# Patient Record
Sex: FEMALE | Race: OTHER | HISPANIC OR LATINO
[De-identification: names, ages, dates, MRNs, and addresses within clinical notes are randomized per-mention and may not be internally consistent; named-entity substitution may affect disease eponyms.]

---

## 2019-08-07 PROBLEM — Z00.00 ENCOUNTER FOR PREVENTIVE HEALTH EXAMINATION: Status: ACTIVE | Noted: 2019-08-07

## 2019-08-20 ENCOUNTER — APPOINTMENT (OUTPATIENT)
Dept: ORTHOPEDIC SURGERY | Facility: CLINIC | Age: 74
End: 2019-08-20

## 2021-07-26 ENCOUNTER — INPATIENT (INPATIENT)
Facility: HOSPITAL | Age: 76
LOS: 4 days | Discharge: HOME HEALTH | End: 2021-07-31
Attending: INTERNAL MEDICINE | Admitting: INTERNAL MEDICINE
Payer: MEDICARE

## 2021-07-26 VITALS
RESPIRATION RATE: 20 BRPM | DIASTOLIC BLOOD PRESSURE: 57 MMHG | SYSTOLIC BLOOD PRESSURE: 143 MMHG | TEMPERATURE: 100 F | HEART RATE: 78 BPM | OXYGEN SATURATION: 98 %

## 2021-07-26 DIAGNOSIS — U07.1 COVID-19: ICD-10-CM

## 2021-07-26 LAB
ALBUMIN SERPL ELPH-MCNC: 3.6 G/DL — SIGNIFICANT CHANGE UP (ref 3.3–5)
ALP SERPL-CCNC: 130 U/L — HIGH (ref 40–120)
ALT FLD-CCNC: 15 U/L — SIGNIFICANT CHANGE UP (ref 4–33)
ANION GAP SERPL CALC-SCNC: 15 MMOL/L — HIGH (ref 7–14)
AST SERPL-CCNC: 20 U/L — SIGNIFICANT CHANGE UP (ref 4–32)
B PERT DNA SPEC QL NAA+PROBE: SIGNIFICANT CHANGE UP
BASOPHILS # BLD AUTO: 0.07 K/UL — SIGNIFICANT CHANGE UP (ref 0–0.2)
BASOPHILS NFR BLD AUTO: 0.8 % — SIGNIFICANT CHANGE UP (ref 0–2)
BILIRUB SERPL-MCNC: 0.2 MG/DL — SIGNIFICANT CHANGE UP (ref 0.2–1.2)
BLOOD GAS VENOUS COMPREHENSIVE RESULT: SIGNIFICANT CHANGE UP
BUN SERPL-MCNC: 24 MG/DL — HIGH (ref 7–23)
C PNEUM DNA SPEC QL NAA+PROBE: SIGNIFICANT CHANGE UP
CALCIUM SERPL-MCNC: 9.4 MG/DL — SIGNIFICANT CHANGE UP (ref 8.4–10.5)
CHLORIDE SERPL-SCNC: 98 MMOL/L — SIGNIFICANT CHANGE UP (ref 98–107)
CO2 SERPL-SCNC: 24 MMOL/L — SIGNIFICANT CHANGE UP (ref 22–31)
CREAT SERPL-MCNC: 1.5 MG/DL — HIGH (ref 0.5–1.3)
D DIMER BLD IA.RAPID-MCNC: 269 NG/ML DDU — HIGH
EOSINOPHIL # BLD AUTO: 0.15 K/UL — SIGNIFICANT CHANGE UP (ref 0–0.5)
EOSINOPHIL NFR BLD AUTO: 1.7 % — SIGNIFICANT CHANGE UP (ref 0–6)
FLUAV SUBTYP SPEC NAA+PROBE: SIGNIFICANT CHANGE UP
FLUBV RNA SPEC QL NAA+PROBE: SIGNIFICANT CHANGE UP
GLUCOSE SERPL-MCNC: 124 MG/DL — HIGH (ref 70–99)
HADV DNA SPEC QL NAA+PROBE: SIGNIFICANT CHANGE UP
HCOV 229E RNA SPEC QL NAA+PROBE: SIGNIFICANT CHANGE UP
HCOV HKU1 RNA SPEC QL NAA+PROBE: SIGNIFICANT CHANGE UP
HCOV NL63 RNA SPEC QL NAA+PROBE: SIGNIFICANT CHANGE UP
HCOV OC43 RNA SPEC QL NAA+PROBE: SIGNIFICANT CHANGE UP
HCT VFR BLD CALC: 39.4 % — SIGNIFICANT CHANGE UP (ref 34.5–45)
HGB BLD-MCNC: 12.2 G/DL — SIGNIFICANT CHANGE UP (ref 11.5–15.5)
HMPV RNA SPEC QL NAA+PROBE: SIGNIFICANT CHANGE UP
HPIV1 RNA SPEC QL NAA+PROBE: SIGNIFICANT CHANGE UP
HPIV2 RNA SPEC QL NAA+PROBE: SIGNIFICANT CHANGE UP
HPIV3 RNA SPEC QL NAA+PROBE: SIGNIFICANT CHANGE UP
HPIV4 RNA SPEC QL NAA+PROBE: SIGNIFICANT CHANGE UP
IANC: 5.44 K/UL — SIGNIFICANT CHANGE UP (ref 1.5–8.5)
IMM GRANULOCYTES NFR BLD AUTO: 0.2 % — SIGNIFICANT CHANGE UP (ref 0–1.5)
LYMPHOCYTES # BLD AUTO: 1.83 K/UL — SIGNIFICANT CHANGE UP (ref 1–3.3)
LYMPHOCYTES # BLD AUTO: 21.1 % — SIGNIFICANT CHANGE UP (ref 13–44)
MAGNESIUM SERPL-MCNC: 2.1 MG/DL — SIGNIFICANT CHANGE UP (ref 1.6–2.6)
MCHC RBC-ENTMCNC: 27.1 PG — SIGNIFICANT CHANGE UP (ref 27–34)
MCHC RBC-ENTMCNC: 31 GM/DL — LOW (ref 32–36)
MCV RBC AUTO: 87.4 FL — SIGNIFICANT CHANGE UP (ref 80–100)
MONOCYTES # BLD AUTO: 1.15 K/UL — HIGH (ref 0–0.9)
MONOCYTES NFR BLD AUTO: 13.3 % — SIGNIFICANT CHANGE UP (ref 2–14)
NEUTROPHILS # BLD AUTO: 5.44 K/UL — SIGNIFICANT CHANGE UP (ref 1.8–7.4)
NEUTROPHILS NFR BLD AUTO: 62.9 % — SIGNIFICANT CHANGE UP (ref 43–77)
NRBC # BLD: 0 /100 WBCS — SIGNIFICANT CHANGE UP
NRBC # FLD: 0 K/UL — SIGNIFICANT CHANGE UP
PHOSPHATE SERPL-MCNC: 3.7 MG/DL — SIGNIFICANT CHANGE UP (ref 2.5–4.5)
PLATELET # BLD AUTO: 308 K/UL — SIGNIFICANT CHANGE UP (ref 150–400)
POTASSIUM SERPL-MCNC: 4.8 MMOL/L — SIGNIFICANT CHANGE UP (ref 3.5–5.3)
POTASSIUM SERPL-SCNC: 4.8 MMOL/L — SIGNIFICANT CHANGE UP (ref 3.5–5.3)
PROT SERPL-MCNC: 7.2 G/DL — SIGNIFICANT CHANGE UP (ref 6–8.3)
RAPID RVP RESULT: DETECTED
RBC # BLD: 4.51 M/UL — SIGNIFICANT CHANGE UP (ref 3.8–5.2)
RBC # FLD: 15 % — HIGH (ref 10.3–14.5)
RSV RNA SPEC QL NAA+PROBE: SIGNIFICANT CHANGE UP
RV+EV RNA SPEC QL NAA+PROBE: SIGNIFICANT CHANGE UP
SARS-COV-2 RNA SPEC QL NAA+PROBE: DETECTED
SODIUM SERPL-SCNC: 137 MMOL/L — SIGNIFICANT CHANGE UP (ref 135–145)
WBC # BLD: 8.66 K/UL — SIGNIFICANT CHANGE UP (ref 3.8–10.5)
WBC # FLD AUTO: 8.66 K/UL — SIGNIFICANT CHANGE UP (ref 3.8–10.5)

## 2021-07-26 PROCEDURE — 71045 X-RAY EXAM CHEST 1 VIEW: CPT | Mod: 26

## 2021-07-26 PROCEDURE — 99285 EMERGENCY DEPT VISIT HI MDM: CPT

## 2021-07-26 RX ORDER — DEXAMETHASONE 0.5 MG/5ML
6 ELIXIR ORAL ONCE
Refills: 0 | Status: COMPLETED | OUTPATIENT
Start: 2021-07-26 | End: 2021-07-26

## 2021-07-26 RX ORDER — BENZOCAINE AND MENTHOL 5; 1 G/100ML; G/100ML
1 LIQUID ORAL ONCE
Refills: 0 | Status: COMPLETED | OUTPATIENT
Start: 2021-07-26 | End: 2021-07-26

## 2021-07-26 RX ORDER — ACETAMINOPHEN 500 MG
650 TABLET ORAL ONCE
Refills: 0 | Status: COMPLETED | OUTPATIENT
Start: 2021-07-26 | End: 2021-07-26

## 2021-07-26 RX ORDER — SODIUM CHLORIDE 9 MG/ML
1000 INJECTION, SOLUTION INTRAVENOUS ONCE
Refills: 0 | Status: COMPLETED | OUTPATIENT
Start: 2021-07-26 | End: 2021-07-26

## 2021-07-26 RX ADMIN — Medication 650 MILLIGRAM(S): at 20:50

## 2021-07-26 RX ADMIN — Medication 6 MILLIGRAM(S): at 23:11

## 2021-07-26 RX ADMIN — SODIUM CHLORIDE 1000 MILLILITER(S): 9 INJECTION, SOLUTION INTRAVENOUS at 23:11

## 2021-07-26 RX ADMIN — Medication 100 MILLIGRAM(S): at 20:50

## 2021-07-26 NOTE — ED PROVIDER NOTE - PROGRESS NOTE DETAILS
Pt desating to 88/89% on room air at rest with any movement. Reports no known CKD. Given early on in course pt would benefit from admission. Francisco Rangel, BENEDICT PGY3

## 2021-07-26 NOTE — ED PROVIDER NOTE - ATTENDING CONTRIBUTION TO CARE
I performed a face-to-face evaluation of the patient and performed a history and physical examination. I agree with the history and physical examination.    Covid currently, fever, cough. No increase in baseline shortness of breath. During the daytime, mostly sits and watches TV. Even before Covid never really walked around much. Rumor is resting oxygen saturation 95%. Will check chest x-ray. Likely get charged with symptomatic treatment

## 2021-07-26 NOTE — ED PROVIDER NOTE - CLINICAL SUMMARY MEDICAL DECISION MAKING FREE TEXT BOX
Danette: Danette: COVID currently, fever, sore throat, cough. No increase in baseline shortness of breath. During the daytime, mostly sits and watches TV. Doesn't walk around. Even before COVID, never really walked around much. Room air resting oxygen saturation 95%. Will check chest x-ray. Likely get charged with symptomatic treatment.

## 2021-07-26 NOTE — ED ADULT NURSE NOTE - NSIMPLEMENTINTERV_GEN_ALL_ED
Implemented All Fall Risk Interventions:  Topmost to call system. Call bell, personal items and telephone within reach. Instruct patient to call for assistance. Room bathroom lighting operational. Non-slip footwear when patient is off stretcher. Physically safe environment: no spills, clutter or unnecessary equipment. Stretcher in lowest position, wheels locked, appropriate side rails in place. Provide visual cue, wrist band, yellow gown, etc. Monitor gait and stability. Monitor for mental status changes and reorient to person, place, and time. Review medications for side effects contributing to fall risk. Reinforce activity limits and safety measures with patient and family.

## 2021-07-26 NOTE — ED ADULT TRIAGE NOTE - DOMESTIC TRAVEL HIGH RISK QUESTION
Benefits, risks, and possible complications of procedure explained to patient/caregiver who verbalized understanding and gave written consent. No

## 2021-07-26 NOTE — ED PROVIDER NOTE - PHYSICAL EXAMINATION
Well appearing, well nourished, awake, alert, oriented to person, place, time/situation and in no apparent distress.    Airway patent    Eyes without scleral injection. No jaundice.    Strong pulse.    Respirations unlabored. (B) crackles.    Abdomen soft, non-tender, no guarding.    Spine appears normal, range of motion is not limited, no muscle or joint tenderness. No LE edema.     Alert and oriented, no gross motor or sensory deficits.    Skin normal color for race, warm, dry and intact. No evidence of rash.    No SI/HI.

## 2021-07-26 NOTE — ED ADULT NURSE NOTE - OBJECTIVE STATEMENT
received pt to rm 7, A&Ox4, nonambulatory at baseline. 77y/o female hx of dm complaining of sore throat and fever/chills. pt states she is covid positive. resps are even and unlabored, spo2 100% on RA. abdomen is soft, nontender, nondistended. denies nausea, vomiting, palpitations, abdominal pain, chest pain. 20G IV placed to left wrist, labs sent. meds given as ordered.

## 2021-07-27 DIAGNOSIS — E11.65 TYPE 2 DIABETES MELLITUS WITH HYPERGLYCEMIA: ICD-10-CM

## 2021-07-27 DIAGNOSIS — N17.9 ACUTE KIDNEY FAILURE, UNSPECIFIED: ICD-10-CM

## 2021-07-27 DIAGNOSIS — E03.9 HYPOTHYROIDISM, UNSPECIFIED: ICD-10-CM

## 2021-07-27 DIAGNOSIS — E11.69 TYPE 2 DIABETES MELLITUS WITH OTHER SPECIFIED COMPLICATION: ICD-10-CM

## 2021-07-27 DIAGNOSIS — Z29.9 ENCOUNTER FOR PROPHYLACTIC MEASURES, UNSPECIFIED: ICD-10-CM

## 2021-07-27 DIAGNOSIS — U07.1 COVID-19: ICD-10-CM

## 2021-07-27 LAB
A1C WITH ESTIMATED AVERAGE GLUCOSE RESULT: 9.2 % — HIGH (ref 4–5.6)
ALBUMIN SERPL ELPH-MCNC: 3.5 G/DL — SIGNIFICANT CHANGE UP (ref 3.3–5)
ALP SERPL-CCNC: 140 U/L — HIGH (ref 40–120)
ALT FLD-CCNC: 19 U/L — SIGNIFICANT CHANGE UP (ref 4–33)
ANION GAP SERPL CALC-SCNC: 14 MMOL/L — SIGNIFICANT CHANGE UP (ref 7–14)
ANION GAP SERPL CALC-SCNC: 17 MMOL/L — HIGH (ref 7–14)
APTT BLD: 32.1 SEC — SIGNIFICANT CHANGE UP (ref 27–36.3)
AST SERPL-CCNC: 22 U/L — SIGNIFICANT CHANGE UP (ref 4–32)
B-OH-BUTYR SERPL-SCNC: 0.6 MMOL/L — HIGH (ref 0–0.4)
BASOPHILS # BLD AUTO: 0.03 K/UL — SIGNIFICANT CHANGE UP (ref 0–0.2)
BASOPHILS NFR BLD AUTO: 0.4 % — SIGNIFICANT CHANGE UP (ref 0–2)
BILIRUB SERPL-MCNC: 0.2 MG/DL — SIGNIFICANT CHANGE UP (ref 0.2–1.2)
BUN SERPL-MCNC: 29 MG/DL — HIGH (ref 7–23)
BUN SERPL-MCNC: 31 MG/DL — HIGH (ref 7–23)
CALCIUM SERPL-MCNC: 8.9 MG/DL — SIGNIFICANT CHANGE UP (ref 8.4–10.5)
CALCIUM SERPL-MCNC: 9.6 MG/DL — SIGNIFICANT CHANGE UP (ref 8.4–10.5)
CHLORIDE SERPL-SCNC: 93 MMOL/L — LOW (ref 98–107)
CHLORIDE SERPL-SCNC: 97 MMOL/L — LOW (ref 98–107)
CO2 SERPL-SCNC: 21 MMOL/L — LOW (ref 22–31)
CO2 SERPL-SCNC: 22 MMOL/L — SIGNIFICANT CHANGE UP (ref 22–31)
COVID-19 SPIKE DOMAIN AB INTERP: POSITIVE
COVID-19 SPIKE DOMAIN ANTIBODY RESULT: >250 U/ML — HIGH
CREAT SERPL-MCNC: 1.4 MG/DL — HIGH (ref 0.5–1.3)
CREAT SERPL-MCNC: 1.41 MG/DL — HIGH (ref 0.5–1.3)
CRP SERPL-MCNC: 20.7 MG/L — HIGH
EOSINOPHIL # BLD AUTO: 0 K/UL — SIGNIFICANT CHANGE UP (ref 0–0.5)
EOSINOPHIL NFR BLD AUTO: 0 % — SIGNIFICANT CHANGE UP (ref 0–6)
ESTIMATED AVERAGE GLUCOSE: 217 — SIGNIFICANT CHANGE UP
FERRITIN SERPL-MCNC: 151 NG/ML — HIGH (ref 15–150)
GLUCOSE BLDC GLUCOMTR-MCNC: 277 MG/DL — HIGH (ref 70–99)
GLUCOSE BLDC GLUCOMTR-MCNC: 429 MG/DL — HIGH (ref 70–99)
GLUCOSE BLDC GLUCOMTR-MCNC: 492 MG/DL — CRITICAL HIGH (ref 70–99)
GLUCOSE BLDC GLUCOMTR-MCNC: 531 MG/DL — CRITICAL HIGH (ref 70–99)
GLUCOSE BLDC GLUCOMTR-MCNC: 555 MG/DL — CRITICAL HIGH (ref 70–99)
GLUCOSE BLDC GLUCOMTR-MCNC: 587 MG/DL — CRITICAL HIGH (ref 70–99)
GLUCOSE BLDC GLUCOMTR-MCNC: 588 MG/DL — CRITICAL HIGH (ref 70–99)
GLUCOSE SERPL-MCNC: 492 MG/DL — CRITICAL HIGH (ref 70–99)
GLUCOSE SERPL-MCNC: 614 MG/DL — CRITICAL HIGH (ref 70–99)
HCT VFR BLD CALC: 39 % — SIGNIFICANT CHANGE UP (ref 34.5–45)
HCV AB S/CO SERPL IA: 0.13 S/CO — SIGNIFICANT CHANGE UP (ref 0–0.99)
HCV AB SERPL-IMP: SIGNIFICANT CHANGE UP
HGB BLD-MCNC: 12.2 G/DL — SIGNIFICANT CHANGE UP (ref 11.5–15.5)
IANC: 5.67 K/UL — SIGNIFICANT CHANGE UP (ref 1.5–8.5)
IMM GRANULOCYTES NFR BLD AUTO: 0.4 % — SIGNIFICANT CHANGE UP (ref 0–1.5)
INR BLD: 1.05 RATIO — SIGNIFICANT CHANGE UP (ref 0.88–1.16)
LYMPHOCYTES # BLD AUTO: 1.17 K/UL — SIGNIFICANT CHANGE UP (ref 1–3.3)
LYMPHOCYTES # BLD AUTO: 16.7 % — SIGNIFICANT CHANGE UP (ref 13–44)
MAGNESIUM SERPL-MCNC: 2.2 MG/DL — SIGNIFICANT CHANGE UP (ref 1.6–2.6)
MCHC RBC-ENTMCNC: 27.2 PG — SIGNIFICANT CHANGE UP (ref 27–34)
MCHC RBC-ENTMCNC: 31.3 GM/DL — LOW (ref 32–36)
MCV RBC AUTO: 87.1 FL — SIGNIFICANT CHANGE UP (ref 80–100)
MONOCYTES # BLD AUTO: 0.1 K/UL — SIGNIFICANT CHANGE UP (ref 0–0.9)
MONOCYTES NFR BLD AUTO: 1.4 % — LOW (ref 2–14)
NEUTROPHILS # BLD AUTO: 5.67 K/UL — SIGNIFICANT CHANGE UP (ref 1.8–7.4)
NEUTROPHILS NFR BLD AUTO: 81.1 % — HIGH (ref 43–77)
NRBC # BLD: 0 /100 WBCS — SIGNIFICANT CHANGE UP
NRBC # FLD: 0 K/UL — SIGNIFICANT CHANGE UP
PHOSPHATE SERPL-MCNC: 4.3 MG/DL — SIGNIFICANT CHANGE UP (ref 2.5–4.5)
PLATELET # BLD AUTO: 220 K/UL — SIGNIFICANT CHANGE UP (ref 150–400)
POTASSIUM SERPL-MCNC: 5 MMOL/L — SIGNIFICANT CHANGE UP (ref 3.5–5.3)
POTASSIUM SERPL-MCNC: 5.7 MMOL/L — HIGH (ref 3.5–5.3)
POTASSIUM SERPL-SCNC: 5 MMOL/L — SIGNIFICANT CHANGE UP (ref 3.5–5.3)
POTASSIUM SERPL-SCNC: 5.7 MMOL/L — HIGH (ref 3.5–5.3)
PROCALCITONIN SERPL-MCNC: 0.42 NG/ML — HIGH (ref 0.02–0.1)
PROT SERPL-MCNC: 7 G/DL — SIGNIFICANT CHANGE UP (ref 6–8.3)
PROTHROM AB SERPL-ACNC: 12 SEC — SIGNIFICANT CHANGE UP (ref 10.6–13.6)
RBC # BLD: 4.48 M/UL — SIGNIFICANT CHANGE UP (ref 3.8–5.2)
RBC # FLD: 14.8 % — HIGH (ref 10.3–14.5)
SARS-COV-2 IGG+IGM SERPL QL IA: >250 U/ML — HIGH
SARS-COV-2 IGG+IGM SERPL QL IA: POSITIVE
SODIUM SERPL-SCNC: 129 MMOL/L — LOW (ref 135–145)
SODIUM SERPL-SCNC: 135 MMOL/L — SIGNIFICANT CHANGE UP (ref 135–145)
TSH SERPL-MCNC: 1.83 UIU/ML — SIGNIFICANT CHANGE UP (ref 0.27–4.2)
WBC # BLD: 7 K/UL — SIGNIFICANT CHANGE UP (ref 3.8–10.5)
WBC # FLD AUTO: 7 K/UL — SIGNIFICANT CHANGE UP (ref 3.8–10.5)

## 2021-07-27 PROCEDURE — 99223 1ST HOSP IP/OBS HIGH 75: CPT

## 2021-07-27 RX ORDER — INSULIN LISPRO 100/ML
VIAL (ML) SUBCUTANEOUS EVERY 6 HOURS
Refills: 0 | Status: DISCONTINUED | OUTPATIENT
Start: 2021-07-27 | End: 2021-07-28

## 2021-07-27 RX ORDER — INSULIN GLARGINE 100 [IU]/ML
35 INJECTION, SOLUTION SUBCUTANEOUS EVERY MORNING
Refills: 0 | Status: DISCONTINUED | OUTPATIENT
Start: 2021-07-28 | End: 2021-07-28

## 2021-07-27 RX ORDER — SODIUM ZIRCONIUM CYCLOSILICATE 10 G/10G
10 POWDER, FOR SUSPENSION ORAL THREE TIMES A DAY
Refills: 0 | Status: DISCONTINUED | OUTPATIENT
Start: 2021-07-27 | End: 2021-07-27

## 2021-07-27 RX ORDER — ENOXAPARIN SODIUM 100 MG/ML
40 INJECTION SUBCUTANEOUS EVERY 12 HOURS
Refills: 0 | Status: DISCONTINUED | OUTPATIENT
Start: 2021-07-27 | End: 2021-07-29

## 2021-07-27 RX ORDER — SODIUM CHLORIDE 9 MG/ML
1000 INJECTION, SOLUTION INTRAVENOUS
Refills: 0 | Status: DISCONTINUED | OUTPATIENT
Start: 2021-07-27 | End: 2021-07-31

## 2021-07-27 RX ORDER — INSULIN LISPRO 100/ML
VIAL (ML) SUBCUTANEOUS
Refills: 0 | Status: DISCONTINUED | OUTPATIENT
Start: 2021-07-27 | End: 2021-07-27

## 2021-07-27 RX ORDER — INSULIN GLARGINE 100 [IU]/ML
30 INJECTION, SOLUTION SUBCUTANEOUS AT BEDTIME
Refills: 0 | Status: DISCONTINUED | OUTPATIENT
Start: 2021-07-27 | End: 2021-07-27

## 2021-07-27 RX ORDER — ACETAMINOPHEN 500 MG
650 TABLET ORAL EVERY 6 HOURS
Refills: 0 | Status: DISCONTINUED | OUTPATIENT
Start: 2021-07-27 | End: 2021-07-31

## 2021-07-27 RX ORDER — DEXTROSE 50 % IN WATER 50 %
25 SYRINGE (ML) INTRAVENOUS ONCE
Refills: 0 | Status: DISCONTINUED | OUTPATIENT
Start: 2021-07-27 | End: 2021-07-31

## 2021-07-27 RX ORDER — ONDANSETRON 8 MG/1
4 TABLET, FILM COATED ORAL EVERY 8 HOURS
Refills: 0 | Status: DISCONTINUED | OUTPATIENT
Start: 2021-07-27 | End: 2021-07-31

## 2021-07-27 RX ORDER — GLUCAGON INJECTION, SOLUTION 0.5 MG/.1ML
1 INJECTION, SOLUTION SUBCUTANEOUS ONCE
Refills: 0 | Status: DISCONTINUED | OUTPATIENT
Start: 2021-07-27 | End: 2021-07-31

## 2021-07-27 RX ORDER — INSULIN GLARGINE 100 [IU]/ML
15 INJECTION, SOLUTION SUBCUTANEOUS ONCE
Refills: 0 | Status: COMPLETED | OUTPATIENT
Start: 2021-07-27 | End: 2021-07-27

## 2021-07-27 RX ORDER — SODIUM ZIRCONIUM CYCLOSILICATE 10 G/10G
10 POWDER, FOR SUSPENSION ORAL ONCE
Refills: 0 | Status: DISCONTINUED | OUTPATIENT
Start: 2021-07-27 | End: 2021-07-27

## 2021-07-27 RX ORDER — LEVOTHYROXINE SODIUM 125 MCG
50 TABLET ORAL DAILY
Refills: 0 | Status: DISCONTINUED | OUTPATIENT
Start: 2021-07-27 | End: 2021-07-31

## 2021-07-27 RX ORDER — DEXTROSE 50 % IN WATER 50 %
15 SYRINGE (ML) INTRAVENOUS ONCE
Refills: 0 | Status: DISCONTINUED | OUTPATIENT
Start: 2021-07-27 | End: 2021-07-31

## 2021-07-27 RX ORDER — FUROSEMIDE 40 MG
40 TABLET ORAL DAILY
Refills: 0 | Status: DISCONTINUED | OUTPATIENT
Start: 2021-07-27 | End: 2021-07-31

## 2021-07-27 RX ORDER — PANTOPRAZOLE SODIUM 20 MG/1
40 TABLET, DELAYED RELEASE ORAL
Refills: 0 | Status: DISCONTINUED | OUTPATIENT
Start: 2021-07-27 | End: 2021-07-31

## 2021-07-27 RX ORDER — LANOLIN ALCOHOL/MO/W.PET/CERES
3 CREAM (GRAM) TOPICAL AT BEDTIME
Refills: 0 | Status: DISCONTINUED | OUTPATIENT
Start: 2021-07-27 | End: 2021-07-31

## 2021-07-27 RX ORDER — REMDESIVIR 5 MG/ML
INJECTION INTRAVENOUS
Refills: 0 | Status: COMPLETED | OUTPATIENT
Start: 2021-07-27 | End: 2021-07-31

## 2021-07-27 RX ORDER — REMDESIVIR 5 MG/ML
100 INJECTION INTRAVENOUS EVERY 24 HOURS
Refills: 0 | Status: COMPLETED | OUTPATIENT
Start: 2021-07-28 | End: 2021-07-31

## 2021-07-27 RX ORDER — REMDESIVIR 5 MG/ML
200 INJECTION INTRAVENOUS EVERY 24 HOURS
Refills: 0 | Status: COMPLETED | OUTPATIENT
Start: 2021-07-27 | End: 2021-07-27

## 2021-07-27 RX ORDER — DEXTROSE 50 % IN WATER 50 %
12.5 SYRINGE (ML) INTRAVENOUS ONCE
Refills: 0 | Status: DISCONTINUED | OUTPATIENT
Start: 2021-07-27 | End: 2021-07-31

## 2021-07-27 RX ORDER — DEXAMETHASONE 0.5 MG/5ML
6 ELIXIR ORAL DAILY
Refills: 0 | Status: DISCONTINUED | OUTPATIENT
Start: 2021-07-27 | End: 2021-07-30

## 2021-07-27 RX ORDER — METOPROLOL TARTRATE 50 MG
100 TABLET ORAL DAILY
Refills: 0 | Status: DISCONTINUED | OUTPATIENT
Start: 2021-07-27 | End: 2021-07-31

## 2021-07-27 RX ORDER — INSULIN LISPRO 100/ML
VIAL (ML) SUBCUTANEOUS AT BEDTIME
Refills: 0 | Status: DISCONTINUED | OUTPATIENT
Start: 2021-07-27 | End: 2021-07-27

## 2021-07-27 RX ADMIN — ENOXAPARIN SODIUM 40 MILLIGRAM(S): 100 INJECTION SUBCUTANEOUS at 17:21

## 2021-07-27 RX ADMIN — INSULIN GLARGINE 15 UNIT(S): 100 INJECTION, SOLUTION SUBCUTANEOUS at 02:39

## 2021-07-27 RX ADMIN — Medication 6 MILLIGRAM(S): at 07:17

## 2021-07-27 RX ADMIN — Medication 12: at 17:24

## 2021-07-27 RX ADMIN — Medication 200 MILLIGRAM(S): at 07:18

## 2021-07-27 RX ADMIN — PANTOPRAZOLE SODIUM 40 MILLIGRAM(S): 20 TABLET, DELAYED RELEASE ORAL at 07:16

## 2021-07-27 RX ADMIN — Medication 40 MILLIGRAM(S): at 07:16

## 2021-07-27 RX ADMIN — Medication 12: at 09:04

## 2021-07-27 RX ADMIN — Medication 100 MILLIGRAM(S): at 07:17

## 2021-07-27 RX ADMIN — ENOXAPARIN SODIUM 40 MILLIGRAM(S): 100 INJECTION SUBCUTANEOUS at 07:16

## 2021-07-27 RX ADMIN — Medication 50 MICROGRAM(S): at 07:16

## 2021-07-27 RX ADMIN — INSULIN GLARGINE 15 UNIT(S): 100 INJECTION, SOLUTION SUBCUTANEOUS at 13:45

## 2021-07-27 RX ADMIN — REMDESIVIR 500 MILLIGRAM(S): 5 INJECTION INTRAVENOUS at 07:16

## 2021-07-27 RX ADMIN — Medication 12: at 12:05

## 2021-07-27 NOTE — CONSULT NOTE ADULT - SUBJECTIVE AND OBJECTIVE BOX
HPI:  75 yo f IDDM, HTN, Hypothyroid, leg swelling on lasix (also on toprol xl but no clear h/o HF) h/o stroke. Pt vaccinated with last dose of Moderna end of April. Presenting with several days cough, sore throat, generalized weakness, fever. COVID +.  T max here 100.3 (O). CXR prelim clear, although hazy cp margins. Pt at rest breathing conformably On 2 L, 100%, RR, however when ambulating short distance desats down to mid/upper 80s on RA. SCr 1.5, no baseline for comparison  (27 Jul 2021 01:00)    Endocrine History:    76 yr old F with primary hypothyroidism and Type 2 DM uncontrolled A1C 9.2% here with COVID. Patient has history of longstanding diabetes. At home she takes Lantus 30 units HS and Novolog 30 units before lunch and 30 units before dinner. She was on levothyroxine 50mcg daily but self discontinued 1 month ago. Found to have normal TSH 1.83. Her DM is complicated by CVA. Has high intake of carbs in her diet.             PAST MEDICAL & SURGICAL HISTORY:  Asthma    H/O: HTN (hypertension)    Hypothyroid    HLD (hyperlipidemia)    Diabetes mellitus    CVA (cerebrovascular accident)        FAMILY HISTORY: noncontributory      Social History: Lives with granddaughter    Outpatient Medications: See outpt med rec    MEDICATIONS  (STANDING):  dexAMETHasone  Injectable 6 milliGRAM(s) IV Push daily  dextrose 40% Gel 15 Gram(s) Oral once  dextrose 5%. 1000 milliLiter(s) (50 mL/Hr) IV Continuous <Continuous>  dextrose 5%. 1000 milliLiter(s) (100 mL/Hr) IV Continuous <Continuous>  dextrose 5%. 1000 milliLiter(s) (100 mL/Hr) IV Continuous <Continuous>  dextrose 50% Injectable 25 Gram(s) IV Push once  dextrose 50% Injectable 12.5 Gram(s) IV Push once  dextrose 50% Injectable 25 Gram(s) IV Push once  enoxaparin Injectable 40 milliGRAM(s) SubCutaneous every 12 hours  furosemide    Tablet 40 milliGRAM(s) Oral daily  glucagon  Injectable 1 milliGRAM(s) IntraMuscular once  glucagon  Injectable 1 milliGRAM(s) IntraMuscular once  insulin lispro (ADMELOG) corrective regimen sliding scale   SubCutaneous three times a day before meals  insulin lispro (ADMELOG) corrective regimen sliding scale   SubCutaneous at bedtime  levothyroxine 50 MICROGram(s) Oral daily  metoprolol succinate  milliGRAM(s) Oral daily  pantoprazole    Tablet 40 milliGRAM(s) Oral before breakfast  remdesivir  IVPB   IV Intermittent     MEDICATIONS  (PRN):  acetaminophen   Tablet .. 650 milliGRAM(s) Oral every 6 hours PRN Temp greater or equal to 38.5C (101.3F), Mild Pain (1 - 3)  aluminum hydroxide/magnesium hydroxide/simethicone Suspension 30 milliLiter(s) Oral every 4 hours PRN Dyspepsia  benzonatate 200 milliGRAM(s) Oral every 8 hours PRN Cough  melatonin 3 milliGRAM(s) Oral at bedtime PRN Insomnia  ondansetron Injectable 4 milliGRAM(s) IV Push every 8 hours PRN Nausea and/or Vomiting      Allergies    No Known Allergies    Intolerances      Review of Systems:  Constitutional: No fever  Eyes: No blurry vision  Neuro: No tremors  HEENT: No pain  Cardiovascular: No chest pain, palpitations  Respiratory: No SOB, no cough  GI: No nausea, vomiting, abdominal pain  : No dysuria  Skin: no rash  Psych: no depression  Endocrine: no polyuria, polydipsia      ALL OTHER SYSTEMS REVIEWED AND NEGATIVE      PHYSICAL EXAM:  VITALS: T(C): 36.4 (07-27-21 @ 06:12)  T(F): 97.5 (07-27-21 @ 06:12), Max: 100.3 (07-26-21 @ 19:50)  HR: 72 (07-27-21 @ 06:12) (65 - 78)  BP: 132/60 (07-27-21 @ 06:12) (120/59 - 143/57)  RR:  (16 - 20)  SpO2:  (96% - 100%)  Wt(kg): --  GENERAL: NAD, well-groomed, well-developed  EYES: No proptosis, no lid lag, anicteric  HEENT:  Atraumatic, Normocephalic, moist mucous membranes  RESPIRATORY: Clear to auscultation bilaterally  CARDIOVASCULAR: Regular rate and rhythm  GI: Soft, nontender, non distended, normal bowel sounds  SKIN: Dry, intact, No rashes or lesions  MUSCULOSKELETAL: Full range of motion, normal strength  NEURO: sensation intact, extraocular movements intact, no tremor, normal reflexes  PSYCH: Alert and oriented x 3, normal affect, normal mood    POCT Blood Glucose.: 531 mg/dL (07-27-21 @ 17:13)  POCT Blood Glucose.: 588 mg/dL (07-27-21 @ 13:36)  POCT Blood Glucose.: 587 mg/dL (07-27-21 @ 11:40)  POCT Blood Glucose.: 555 mg/dL (07-27-21 @ 11:39)  POCT Blood Glucose.: 429 mg/dL (07-27-21 @ 08:40)  POCT Blood Glucose.: 277 mg/dL (07-27-21 @ 02:37)  POCT Blood Glucose.: 119 mg/dL (07-26-21 @ 21:42)                            12.2   7.00  )-----------( 220      ( 27 Jul 2021 07:53 )             39.0       07-27    129<L>  |  93<L>  |  31<H>  ----------------------------<  614<HH>  5.0   |  22  |  1.41<H>    EGFR if : 42<L>  EGFR if non : 36<L>    Ca    9.6      07-27  Mg     2.20     07-27  Phos  4.3     07-27    TPro  7.0  /  Alb  3.5  /  TBili  0.2  /  DBili  x   /  AST  22  /  ALT  19  /  AlkPhos  140<H>  07-27      Thyroid Function Tests:  07-27 @ 07:53 TSH 1.83 FreeT4 -- T3 -- Anti TPO -- Anti Thyroglobulin Ab -- TSI --

## 2021-07-27 NOTE — PROGRESS NOTE ADULT - PROBLEM SELECTOR PLAN 4
pt reports taking 30 novolog bid before lunch and dinner as well as basaglar 30 qhs. Despite not getting insulin in ed, and receiving decadron, FS modest 115. will give 15 lantus x1, and mod ISS , assess response.  - Glucose levels 500s, s/p Lantus 15 Units 2AM, 15 Units 1PM. Additional ISS given.   - ENDO consulted, follow up recommendations.   - C02 22, AG 14. BHB 0.6 earlier. Repeat pending.

## 2021-07-27 NOTE — H&P ADULT - NSICDXPASTMEDICALHX_GEN_ALL_CORE_FT
PAST MEDICAL HISTORY:  Asthma     Diabetes mellitus     H/O: HTN (hypertension)     HLD (hyperlipidemia)     Hypothyroid      PAST MEDICAL HISTORY:  Asthma     CVA (cerebrovascular accident)     Diabetes mellitus     H/O: HTN (hypertension)     HLD (hyperlipidemia)     Hypothyroid

## 2021-07-27 NOTE — H&P ADULT - NSHPPHYSICALEXAM_GEN_ALL_CORE
PHYSICAL EXAM:      Constitutional: NAD, well-groomed, well-developed  HEENT:  EOMI, Normal Hearing  Neck: No LAD, No JVD  Back: Normal spine flexure, No CVA tenderness  Respiratory: bibasilar crackles  Cardiovascular: S1 and S2, RRR  Gastrointestinal: BS+, soft, NT/ND  Extremities: No peripheral edema  Vascular: 2+ peripheral pulses  Neurological: A/O x 3, no focal deficits  Psychiatric: Normal mood, normal affect  Musculoskeletal: 5/5 strength b/l upper and lower extremities  Skin: No rashes

## 2021-07-27 NOTE — H&P ADULT - PROBLEM SELECTOR PLAN 4
pt reports taking 30 novolog bid before luch and dinner as well as basaglar 30 qhs. Despite not getting insulin in ed, and receiving decadron, FS modest 115. will give 15 lantus x1, and mod ISS , assess response and laurence regimen appropriately

## 2021-07-27 NOTE — CONSULT NOTE ADULT - ASSESSMENT
76 yr old F with primary hypothyroidism and Type 2 DM uncontrolled A1C 9.2% here with COVID. Endocrine consulted for steroid exacerbated hyperglycemia in 600s.

## 2021-07-27 NOTE — H&P ADULT - NSHPREVIEWOFSYSTEMS_GEN_ALL_CORE
Review of Systems:   CONSTITUTIONAL: fever, fatigue  EYES: No eye pain, visual disturbances, or discharge  ENMT:  No difficulty hearing, tinnitus, vertigo; resolved throat pain  NECK: No pain or stiffness  RESPIRATORY: + cough;  shortness of breath on exertion  CARDIOVASCULAR: No chest pain, palpitations, dizziness, or leg swelling  GASTROINTESTINAL: No abdominal or epigastric pain. No nausea, vomiting, or hematemesis; No diarrhea or constipation. No melena or hematochezia.  GENITOURINARY: No dysuria, frequency, hematuria, or incontinence  NEUROLOGICAL: No headaches, memory loss, loss of strength, numbness, or tremors  SKIN: No itching, burning, rashes, or lesions   MUSCULOSKELETAL: No joint pain or swelling; No muscle, back, or extremity pain

## 2021-07-27 NOTE — H&P ADULT - HISTORY OF PRESENT ILLNESS
75 yo f IDDM, HTN, Hypothyroid, leg swelling on lasix , also on toprol xl but no clear h/o HF, h/o stroke. Pt vaccinated with last dose of Moderna end of April 77 yo f IDDM, HTN, Hypothyroid, leg swelling on lasix (also on toprol xl but no clear h/o HF) h/o stroke. Pt vaccinated with last dose of Moderna end of April. Presenting with several days cough, sore throat, generalized weakness, fever. COVID +.  T max here 100.3 (O). CXR prelim clear, although hazy cp margins. Pt at rest breathing conformably On 2 L, 100%, RR, however when ambulating short distance desats down to mid/upper 80s on RA. Pt  75 yo f IDDM, HTN, Hypothyroid, leg swelling on lasix (also on toprol xl but no clear h/o HF) h/o stroke. Pt vaccinated with last dose of Moderna end of April. Presenting with several days cough, sore throat, generalized weakness, fever. COVID +.  T max here 100.3 (O). CXR prelim clear, although hazy cp margins. Pt at rest breathing conformably On 2 L, 100%, RR, however when ambulating short distance desats down to mid/upper 80s on RA.  77 yo f IDDM, HTN, Hypothyroid, leg swelling on lasix (also on toprol xl but no clear h/o HF) h/o stroke. Pt vaccinated with last dose of Moderna end of April. Presenting with several days cough, sore throat, generalized weakness, fever. COVID +.  T max here 100.3 (O). CXR prelim clear, although hazy cp margins. Pt at rest breathing conformably On 2 L, 100%, RR, however when ambulating short distance desats down to mid/upper 80s on RA. SCr 1.5, no baseline for comparison

## 2021-07-27 NOTE — PROGRESS NOTE ADULT - SUBJECTIVE AND OBJECTIVE BOX
PROGRESS NOTE:     Patient is a 76y old  Female who presents with a chief complaint of covid (27 Jul 2021 01:00)    SUBJECTIVE / OVERNIGHT EVENTS: Patient seen and evaluated at bedside. NO acute distress evident. Saturating well on room air. Reports no sob, chest pain, or cough. Glucose 500s this AM, pending Endo recs for DM regimen while in on steroids.     ADDITIONAL REVIEW OF SYSTEMS:    MEDICATIONS  (STANDING):  dexAMETHasone  Injectable 6 milliGRAM(s) IV Push daily  dextrose 40% Gel 15 Gram(s) Oral once  dextrose 5%. 1000 milliLiter(s) (50 mL/Hr) IV Continuous <Continuous>  dextrose 5%. 1000 milliLiter(s) (100 mL/Hr) IV Continuous <Continuous>  dextrose 5%. 1000 milliLiter(s) (100 mL/Hr) IV Continuous <Continuous>  dextrose 50% Injectable 25 Gram(s) IV Push once  dextrose 50% Injectable 12.5 Gram(s) IV Push once  dextrose 50% Injectable 25 Gram(s) IV Push once  enoxaparin Injectable 40 milliGRAM(s) SubCutaneous every 12 hours  furosemide    Tablet 40 milliGRAM(s) Oral daily  glucagon  Injectable 1 milliGRAM(s) IntraMuscular once  glucagon  Injectable 1 milliGRAM(s) IntraMuscular once  insulin lispro (ADMELOG) corrective regimen sliding scale   SubCutaneous three times a day before meals  insulin lispro (ADMELOG) corrective regimen sliding scale   SubCutaneous at bedtime  levothyroxine 50 MICROGram(s) Oral daily  metoprolol succinate  milliGRAM(s) Oral daily  pantoprazole    Tablet 40 milliGRAM(s) Oral before breakfast  remdesivir  IVPB   IV Intermittent     MEDICATIONS  (PRN):  acetaminophen   Tablet .. 650 milliGRAM(s) Oral every 6 hours PRN Temp greater or equal to 38.5C (101.3F), Mild Pain (1 - 3)  aluminum hydroxide/magnesium hydroxide/simethicone Suspension 30 milliLiter(s) Oral every 4 hours PRN Dyspepsia  benzonatate 200 milliGRAM(s) Oral every 8 hours PRN Cough  melatonin 3 milliGRAM(s) Oral at bedtime PRN Insomnia  ondansetron Injectable 4 milliGRAM(s) IV Push every 8 hours PRN Nausea and/or Vomiting    CAPILLARY BLOOD GLUCOSE  POCT Blood Glucose.: 588 mg/dL (27 Jul 2021 13:36)  POCT Blood Glucose.: 587 mg/dL (27 Jul 2021 11:40)  POCT Blood Glucose.: 555 mg/dL (27 Jul 2021 11:39)  POCT Blood Glucose.: 429 mg/dL (27 Jul 2021 08:40)  POCT Blood Glucose.: 277 mg/dL (27 Jul 2021 02:37)  POCT Blood Glucose.: 119 mg/dL (26 Jul 2021 21:42)    I&O's Summary    PHYSICAL EXAM:  Vital Signs Last 24 Hrs  T(C): 36.4 (27 Jul 2021 06:12), Max: 37.9 (26 Jul 2021 19:50)  T(F): 97.5 (27 Jul 2021 06:12), Max: 100.3 (26 Jul 2021 19:50)  HR: 72 (27 Jul 2021 06:12) (65 - 78)  BP: 132/60 (27 Jul 2021 06:12) (120/59 - 143/57)  BP(mean): --  RR: 16 (27 Jul 2021 06:12) (16 - 20)  SpO2: 96% (27 Jul 2021 06:12) (96% - 100%)    CONSTITUTIONAL: NAD, well-developed, elderly, comfortable appearing.   RESPIRATORY: Normal respiratory effort; lungs are clear to auscultation bilaterally  CARDIOVASCULAR: Regular rate and rhythm, normal S1 and S2,  No lower extremity edema; Peripheral pulses are 2+ bilaterally  ABDOMEN: Nontender to palpation, normoactive bowel sounds  MUSCLOSKELETAL: no clubbing or cyanosis of digits; no joint swelling or tenderness to palpation  PSYCH: A+O to person, place, and time; affect appropriate    LABS: reviewed.                        12.2   7.00  )-----------( 220      ( 27 Jul 2021 07:53 )             39.0     07-27    129<L>  |  93<L>  |  31<H>  ----------------------------<  614<HH>  5.0   |  22  |  1.41<H>    Ca    9.6      27 Jul 2021 13:24  Phos  4.3     07-27  Mg     2.20     07-27    TPro  7.0  /  Alb  3.5  /  TBili  0.2  /  DBili  x   /  AST  22  /  ALT  19  /  AlkPhos  140<H>  07-27    PT/INR - ( 27 Jul 2021 07:53 )   PT: 12.0 sec;   INR: 1.05 ratio      PTT - ( 27 Jul 2021 07:53 )  PTT:32.1 sec    RADIOLOGY & ADDITIONAL TESTS:  Results Reviewed:   Imaging Personally Reviewed:  Electrocardiogram Personally Reviewed:    COORDINATION OF CARE:  Care Discussed with Consultants/Other Providers [Y/N]:  Prior or Outpatient Records Reviewed [Y/N]:

## 2021-07-27 NOTE — H&P ADULT - NSHPLABSRESULTS_GEN_ALL_CORE
12.2   8.66  )-----------( 308      ( 26 Jul 2021 21:12 )             39.4     07-26    137  |  98  |  24<H>  ----------------------------<  124<H>  4.8   |  24  |  1.50<H>    Ca    9.4      26 Jul 2021 21:12  Phos  3.7     07-26  Mg     2.10     07-26    TPro  7.2  /  Alb  3.6  /  TBili  0.2  /  DBili  x   /  AST  20  /  ALT  15  /  AlkPhos  130<H>  07-26    CAPILLARY BLOOD GLUCOSE      POCT Blood Glucose.: 277 mg/dL (27 Jul 2021 02:37)  POCT Blood Glucose.: 119 mg/dL (26 Jul 2021 21:42)        Vital Signs Last 24 Hrs  T(C): 36.7 (27 Jul 2021 00:59), Max: 37.9 (26 Jul 2021 19:50)  T(F): 98 (27 Jul 2021 00:59), Max: 100.3 (26 Jul 2021 19:50)  HR: 65 (27 Jul 2021 00:07) (65 - 78)  BP: 141/67 (27 Jul 2021 00:59) (120/59 - 143/57)  BP(mean): --  RR: 17 (27 Jul 2021 00:59) (16 - 20)  SpO2: 97% (27 Jul 2021 00:59) (96% - 100%)

## 2021-07-27 NOTE — CONSULT NOTE ADULT - PROBLEM SELECTOR RECOMMENDATION 9
Would keep patient NPO until FS<300  Keep on moderate correctional scale q 6 hours  Recommend Lantus 35 units (give on 7/28 AM) and daily in AM, Admelog 15 Units TIDAC when started on CHO diet and moderate correctional scale before meals and bedtime  Patient will be discharged on basal/bolus  Endocrine team will follow

## 2021-07-28 ENCOUNTER — TRANSCRIPTION ENCOUNTER (OUTPATIENT)
Age: 76
End: 2021-07-28

## 2021-07-28 LAB
ALBUMIN SERPL ELPH-MCNC: 3.8 G/DL — SIGNIFICANT CHANGE UP (ref 3.3–5)
ALP SERPL-CCNC: 129 U/L — HIGH (ref 40–120)
ALT FLD-CCNC: 16 U/L — SIGNIFICANT CHANGE UP (ref 4–33)
ANION GAP SERPL CALC-SCNC: 15 MMOL/L — HIGH (ref 7–14)
ANION GAP SERPL CALC-SCNC: 15 MMOL/L — HIGH (ref 7–14)
AST SERPL-CCNC: 15 U/L — SIGNIFICANT CHANGE UP (ref 4–32)
B-OH-BUTYR SERPL-SCNC: <0 MMOL/L — SIGNIFICANT CHANGE UP (ref 0–0.4)
BASE EXCESS BLDV CALC-SCNC: 0.3 MMOL/L — SIGNIFICANT CHANGE UP (ref -3–2)
BILIRUB SERPL-MCNC: <0.2 MG/DL — SIGNIFICANT CHANGE UP (ref 0.2–1.2)
BUN SERPL-MCNC: 41 MG/DL — HIGH (ref 7–23)
BUN SERPL-MCNC: 48 MG/DL — HIGH (ref 7–23)
CALCIUM SERPL-MCNC: 9 MG/DL — SIGNIFICANT CHANGE UP (ref 8.4–10.5)
CALCIUM SERPL-MCNC: 9.4 MG/DL — SIGNIFICANT CHANGE UP (ref 8.4–10.5)
CHLORIDE SERPL-SCNC: 96 MMOL/L — LOW (ref 98–107)
CHLORIDE SERPL-SCNC: 97 MMOL/L — LOW (ref 98–107)
CO2 SERPL-SCNC: 21 MMOL/L — LOW (ref 22–31)
CO2 SERPL-SCNC: 25 MMOL/L — SIGNIFICANT CHANGE UP (ref 22–31)
CREAT SERPL-MCNC: 1.35 MG/DL — HIGH (ref 0.5–1.3)
CREAT SERPL-MCNC: 1.66 MG/DL — HIGH (ref 0.5–1.3)
GLUCOSE BLDC GLUCOMTR-MCNC: 245 MG/DL — HIGH (ref 70–99)
GLUCOSE BLDC GLUCOMTR-MCNC: 279 MG/DL — HIGH (ref 70–99)
GLUCOSE BLDC GLUCOMTR-MCNC: 378 MG/DL — HIGH (ref 70–99)
GLUCOSE BLDC GLUCOMTR-MCNC: 415 MG/DL — HIGH (ref 70–99)
GLUCOSE BLDC GLUCOMTR-MCNC: 446 MG/DL — HIGH (ref 70–99)
GLUCOSE BLDC GLUCOMTR-MCNC: 455 MG/DL — CRITICAL HIGH (ref 70–99)
GLUCOSE BLDC GLUCOMTR-MCNC: 459 MG/DL — CRITICAL HIGH (ref 70–99)
GLUCOSE BLDC GLUCOMTR-MCNC: 511 MG/DL — CRITICAL HIGH (ref 70–99)
GLUCOSE SERPL-MCNC: 258 MG/DL — HIGH (ref 70–99)
GLUCOSE SERPL-MCNC: 543 MG/DL — CRITICAL HIGH (ref 70–99)
HCO3 BLDV-SCNC: 24 MMOL/L — SIGNIFICANT CHANGE UP (ref 20–27)
HCT VFR BLD CALC: 40.3 % — SIGNIFICANT CHANGE UP (ref 34.5–45)
HGB BLD-MCNC: 12.6 G/DL — SIGNIFICANT CHANGE UP (ref 11.5–15.5)
MAGNESIUM SERPL-MCNC: 2.1 MG/DL — SIGNIFICANT CHANGE UP (ref 1.6–2.6)
MAGNESIUM SERPL-MCNC: 2.3 MG/DL — SIGNIFICANT CHANGE UP (ref 1.6–2.6)
MCHC RBC-ENTMCNC: 27.4 PG — SIGNIFICANT CHANGE UP (ref 27–34)
MCHC RBC-ENTMCNC: 31.3 GM/DL — LOW (ref 32–36)
MCV RBC AUTO: 87.6 FL — SIGNIFICANT CHANGE UP (ref 80–100)
NRBC # BLD: 0 /100 WBCS — SIGNIFICANT CHANGE UP
NRBC # FLD: 0 K/UL — SIGNIFICANT CHANGE UP
PCO2 BLDV: 47 MMHG — SIGNIFICANT CHANGE UP (ref 41–51)
PH BLDV: 7.35 — SIGNIFICANT CHANGE UP (ref 7.32–7.43)
PHOSPHATE SERPL-MCNC: 3.4 MG/DL — SIGNIFICANT CHANGE UP (ref 2.5–4.5)
PLATELET # BLD AUTO: 337 K/UL — SIGNIFICANT CHANGE UP (ref 150–400)
PO2 BLDV: 84 MMHG — HIGH (ref 35–40)
POTASSIUM SERPL-MCNC: 4.5 MMOL/L — SIGNIFICANT CHANGE UP (ref 3.5–5.3)
POTASSIUM SERPL-MCNC: 4.8 MMOL/L — SIGNIFICANT CHANGE UP (ref 3.5–5.3)
POTASSIUM SERPL-SCNC: 4.5 MMOL/L — SIGNIFICANT CHANGE UP (ref 3.5–5.3)
POTASSIUM SERPL-SCNC: 4.8 MMOL/L — SIGNIFICANT CHANGE UP (ref 3.5–5.3)
PROT SERPL-MCNC: 7.2 G/DL — SIGNIFICANT CHANGE UP (ref 6–8.3)
RBC # BLD: 4.6 M/UL — SIGNIFICANT CHANGE UP (ref 3.8–5.2)
RBC # FLD: 14.7 % — HIGH (ref 10.3–14.5)
SAO2 % BLDV: 95.9 % — HIGH (ref 60–85)
SODIUM SERPL-SCNC: 132 MMOL/L — LOW (ref 135–145)
SODIUM SERPL-SCNC: 137 MMOL/L — SIGNIFICANT CHANGE UP (ref 135–145)
T4 FREE SERPL-MCNC: 1.1 NG/DL — SIGNIFICANT CHANGE UP (ref 0.9–1.8)
TSH SERPL-MCNC: 2.11 UIU/ML — SIGNIFICANT CHANGE UP (ref 0.27–4.2)
WBC # BLD: 9 K/UL — SIGNIFICANT CHANGE UP (ref 3.8–10.5)
WBC # FLD AUTO: 9 K/UL — SIGNIFICANT CHANGE UP (ref 3.8–10.5)

## 2021-07-28 PROCEDURE — 99232 SBSQ HOSP IP/OBS MODERATE 35: CPT

## 2021-07-28 PROCEDURE — 99233 SBSQ HOSP IP/OBS HIGH 50: CPT

## 2021-07-28 RX ORDER — INSULIN GLARGINE 100 [IU]/ML
50 INJECTION, SOLUTION SUBCUTANEOUS EVERY MORNING
Refills: 0 | Status: DISCONTINUED | OUTPATIENT
Start: 2021-07-29 | End: 2021-07-29

## 2021-07-28 RX ORDER — INSULIN LISPRO 100/ML
15 VIAL (ML) SUBCUTANEOUS
Refills: 0 | Status: DISCONTINUED | OUTPATIENT
Start: 2021-07-28 | End: 2021-07-28

## 2021-07-28 RX ORDER — INSULIN LISPRO 100/ML
24 VIAL (ML) SUBCUTANEOUS
Refills: 0 | Status: DISCONTINUED | OUTPATIENT
Start: 2021-07-28 | End: 2021-07-29

## 2021-07-28 RX ORDER — HUMAN INSULIN 100 [IU]/ML
7 INJECTION, SUSPENSION SUBCUTANEOUS ONCE
Refills: 0 | Status: COMPLETED | OUTPATIENT
Start: 2021-07-28 | End: 2021-07-28

## 2021-07-28 RX ORDER — INSULIN LISPRO 100/ML
VIAL (ML) SUBCUTANEOUS
Refills: 0 | Status: DISCONTINUED | OUTPATIENT
Start: 2021-07-28 | End: 2021-07-28

## 2021-07-28 RX ORDER — INSULIN LISPRO 100/ML
VIAL (ML) SUBCUTANEOUS AT BEDTIME
Refills: 0 | Status: DISCONTINUED | OUTPATIENT
Start: 2021-07-28 | End: 2021-07-31

## 2021-07-28 RX ORDER — INSULIN LISPRO 100/ML
VIAL (ML) SUBCUTANEOUS
Refills: 0 | Status: DISCONTINUED | OUTPATIENT
Start: 2021-07-28 | End: 2021-07-31

## 2021-07-28 RX ADMIN — Medication 6 MILLIGRAM(S): at 07:12

## 2021-07-28 RX ADMIN — Medication 40 MILLIGRAM(S): at 07:16

## 2021-07-28 RX ADMIN — ENOXAPARIN SODIUM 40 MILLIGRAM(S): 100 INJECTION SUBCUTANEOUS at 07:13

## 2021-07-28 RX ADMIN — Medication 50 MICROGRAM(S): at 07:16

## 2021-07-28 RX ADMIN — Medication 12: at 17:42

## 2021-07-28 RX ADMIN — INSULIN GLARGINE 35 UNIT(S): 100 INJECTION, SOLUTION SUBCUTANEOUS at 07:14

## 2021-07-28 RX ADMIN — Medication 15 UNIT(S): at 17:43

## 2021-07-28 RX ADMIN — HUMAN INSULIN 7 UNIT(S): 100 INJECTION, SUSPENSION SUBCUTANEOUS at 20:48

## 2021-07-28 RX ADMIN — Medication 100 MILLIGRAM(S): at 07:16

## 2021-07-28 RX ADMIN — Medication 8: at 22:07

## 2021-07-28 RX ADMIN — PANTOPRAZOLE SODIUM 40 MILLIGRAM(S): 20 TABLET, DELAYED RELEASE ORAL at 07:16

## 2021-07-28 RX ADMIN — REMDESIVIR 500 MILLIGRAM(S): 5 INJECTION INTRAVENOUS at 07:11

## 2021-07-28 RX ADMIN — Medication 10: at 00:25

## 2021-07-28 RX ADMIN — Medication 12: at 12:22

## 2021-07-28 RX ADMIN — Medication 4: at 07:15

## 2021-07-28 RX ADMIN — ENOXAPARIN SODIUM 40 MILLIGRAM(S): 100 INJECTION SUBCUTANEOUS at 17:42

## 2021-07-28 RX ADMIN — Medication 15 UNIT(S): at 12:23

## 2021-07-28 NOTE — DISCHARGE NOTE PROVIDER - NSDCCPCAREPLAN_GEN_ALL_CORE_FT
PRINCIPAL DISCHARGE DIAGNOSIS  Diagnosis: COVID-19  Assessment and Plan of Treatment: You were found to be positive COVID 19 virus. Please self quarantine at home for 14 days from discharge and stay 6 feet away minimum from other individuals or animals. Do not go to work, school, public areas, or use public transportaion. Restrict outside activity except for  medical care. Please call ahead if you have an appointment with your doctor to inform them of your COVID positive status. Always wear a facemask at home. Cover your sneezes and coughs, and wash hands with soap and water for at least 20 seconds frequently. Avoid sharing personal household items. Clean all surfaces where you contact frequently such as coutners and doorknobs frequently.  If you have any worsening breathing, faster breathing or trouble with breathing call 911 immediately or your healthcare provider ahead of time and wear facemask before being seen by medical personel.   Take tylenol for your fevers. Please follow state and local rules and regulations regarding coming off of isolation.      SECONDARY DISCHARGE DIAGNOSES  Diagnosis: Type 2 diabetes mellitus with other specified complication, unspecified whether long term insulin use  Assessment and Plan of Treatment:     Diagnosis: Steroid-induced hyperglycemia  Assessment and Plan of Treatment: Steroid-induced hyperglycemia    Diagnosis: Hypothyroidism, unspecified type  Assessment and Plan of Treatment: Continue Synthroid. repeat thyroid function test in 6 weeks.     PRINCIPAL DISCHARGE DIAGNOSIS  Diagnosis: COVID-19  Assessment and Plan of Treatment: You were found to be positive COVID 19 virus. Please self quarantine at home for 14 days from discharge and stay 6 feet away minimum from other individuals or animals. Do not go to work, school, public areas, or use public transportaion. Restrict outside activity except for  medical care. Please call ahead if you have an appointment with your doctor to inform them of your COVID positive status. Always wear a facemask at home. Cover your sneezes and coughs, and wash hands with soap and water for at least 20 seconds frequently. Avoid sharing personal household items. Clean all surfaces where you contact frequently such as coutners and doorknobs frequently.  If you have any worsening breathing, faster breathing or trouble with breathing call 911 immediately or your healthcare provider ahead of time and wear facemask before being seen by medical personel.   Take tylenol for your fevers. Please follow state and local rules and regulations regarding coming off of isolation.      SECONDARY DISCHARGE DIAGNOSES  Diagnosis: Hypothyroidism, unspecified type  Assessment and Plan of Treatment: Continue Synthroid.   Follow up with your Primary care doctor. You will need repeat thyroid function test in 6 weeks.    Diagnosis: Type 2 diabetes mellitus with other specified complication, unspecified whether long term insulin use  Assessment and Plan of Treatment:     Diagnosis: Steroid-induced hyperglycemia  Assessment and Plan of Treatment: Steroid-induced hyperglycemia     PRINCIPAL DISCHARGE DIAGNOSIS  Diagnosis: COVID-19  Assessment and Plan of Treatment: You were found to be positive COVID 19 virus. Please self quarantine at home for 14 days from discharge and stay 6 feet away minimum from other individuals or animals. Do not go to work, school, public areas, or use public transportaion. Restrict outside activity except for  medical care. Please call ahead if you have an appointment with your doctor to inform them of your COVID positive status. Always wear a facemask at home. Cover your sneezes and coughs, and wash hands with soap and water for at least 20 seconds frequently. Avoid sharing personal household items. Clean all surfaces where you contact frequently such as coutners and doorknobs frequently.  If you have any worsening breathing, faster breathing or trouble with breathing call 911 immediately or your healthcare provider ahead of time and wear facemask before being seen by medical personel.   Take tylenol for your fevers. Please follow state and local rules and regulations regarding coming off of isolation.      SECONDARY DISCHARGE DIAGNOSES  Diagnosis: Hypothyroidism, unspecified type  Assessment and Plan of Treatment: Continue Synthroid.   Follow up with your Primary care doctor. You will need repeat thyroid function test in 6 weeks.    Diagnosis: Type 2 diabetes mellitus with other specified complication, unspecified whether long term insulin use  Assessment and Plan of Treatment: Continue Basaglar and Novolog   Follow-up with PCP, should also have regular visits with opthalmology and podiatry - can follow up with [facility] endocrinology if you do not have an endocrinologist. [phone].  Please hold Farxiaga until you are seen by your Primary care doctor you will still need repeat blood test a BMP prior to resumig medication.      Diagnosis: Steroid-induced hyperglycemia  Assessment and Plan of Treatment: Steroids have been stopped and Finger sticks should continue to improve.     PRINCIPAL DISCHARGE DIAGNOSIS  Diagnosis: COVID-19  Assessment and Plan of Treatment: You were found to be positive COVID 19 virus. Please self quarantine at home for 14 days from discharge and stay 6 feet away minimum from other individuals or animals. Do not go to work, school, public areas, or use public transportaion. Restrict outside activity except for  medical care. Please call ahead if you have an appointment with your doctor to inform them of your COVID positive status. Always wear a facemask at home. Cover your sneezes and coughs, and wash hands with soap and water for at least 20 seconds frequently. Avoid sharing personal household items. Clean all surfaces where you contact frequently such as coutners and doorknobs frequently.  If you have any worsening breathing, faster breathing or trouble with breathing call 911 immediately or your healthcare provider ahead of time and wear facemask before being seen by medical personel.   Take tylenol for your fevers. Please follow state and local rules and regulations regarding coming off of isolation.      SECONDARY DISCHARGE DIAGNOSES  Diagnosis: Hypothyroidism, unspecified type  Assessment and Plan of Treatment: Continue Synthroid.   Follow up with your Primary care doctor. You will need repeat thyroid function test in 6 weeks.    Diagnosis: Type 2 diabetes mellitus with other specified complication, unspecified whether long term insulin use  Assessment and Plan of Treatment: Continue Basaglar and Novolog   Follow-up with PCP, should also have regular visits with opthalmology and podiatry - can follow up with [facility] endocrinology if you do not have an endocrinologist. [phone].  Please hold Farxiaga until you are seen by your Primary care doctor you will still need repeat blood test a BMP prior to resumig medication.      Diagnosis: Steroid-induced hyperglycemia  Assessment and Plan of Treatment: Steroids have been stopped and Finger sticks should continue to improve.  Follow up with your PCP in 1 week. Call for an appointment

## 2021-07-28 NOTE — PROVIDER CONTACT NOTE (OTHER) - ACTION/TREATMENT ORDERED:
Make patient NPO and give ordered insulin coverage.
8 units Admelog given as per sliding scale.
DOMINIQUE Krueger ([id number]) notified about high blood glucose level. gave insulin  and will continue to monitor pt at this time.
Will give ordered NPH and recheck FS at bedtime.
Give ordered insulin coverage

## 2021-07-28 NOTE — DISCHARGE NOTE PROVIDER - NSDCFUADDAPPT_GEN_ALL_CORE_FT
If you are in need of a general medicine physician and post-discharge medical follow-up for further care/recommendations you may contact the [facility] for an appointment ([phone]).  If you are in need of a general medicine physician and post-discharge medical follow-up for further care/recommendations you may contact the [facility] for an appointment ([phone]).       You were referred to [facility] program. You will receive a phone call from program for   additional follow-up for COVID

## 2021-07-28 NOTE — PROVIDER CONTACT NOTE (CRITICAL VALUE NOTIFICATION) - ASSESSMENT
blood glucose monitoring 429. toxicology called and reported blood serum level 492
no s/s of DKA. or any distress noted . patient up and alert on phone with family at this time.
Patient is asymptomatic

## 2021-07-28 NOTE — DISCHARGE NOTE PROVIDER - NSDCMRMEDTOKEN_GEN_ALL_CORE_FT
Allegra 180 mg oral tablet: 1 tab(s) orally once a day  Basaglar KwikPen 100 units/mL subcutaneous solution: 30 unit(s) subcutaneous once a day (at bedtime)  Cozaar 100 mg oral tablet: 1 tab(s) orally once a day  Farxiga 5 mg oral tablet: 1 tab(s) orally once a day  Lasix 40 mg oral tablet: 1 tab(s) orally once a day  NovoLOG 100 units/mL injectable solution: 30 unit(s) injectable 2 times a day (before meals)  omeprazole 40 mg oral delayed release capsule: 1 cap(s) orally once a day  Synthroid 50 mcg (0.05 mg) oral tablet: 1 tab(s) orally once a day  Toprol- mg oral tablet, extended release: 1 tab(s) orally once a day  ZyrTEC 10 mg oral tablet: 1 tab(s) orally once a day   acetaminophen 325 mg oral tablet: 2 tab(s) orally every 6 hours, As needed, Temp greater or equal to 38.5C (101.3F), Mild Pain (1 - 3)  alcohol swabs : Apply topically to affected area 4 times a day   Allegra 180 mg oral tablet: 1 tab(s) orally once a day  Basaglar KwikPen 100 units/mL subcutaneous solution: 30 unit(s) subcutaneous once a day (at bedtime)  Cozaar 100 mg oral tablet: 1 tab(s) orally once a day  Farxiga 5 mg oral tablet: 1 tab(s) orally once a day  Insulin Pen Needles, 4mm: 1 application subcutaneously 4 times a day. ** Use with insulin pen **   lancets: 1 application subcutaneously 4 times a day   Lasix 40 mg oral tablet: 1 tab(s) orally once a day  NovoLOG 100 units/mL injectable solution: 30 unit(s) injectable 2 times a day (before meals)  omeprazole 40 mg oral delayed release capsule: 1 cap(s) orally once a day  polyethylene glycol 3350 oral powder for reconstitution: 17 gram(s) orally once a day as needed for constipation   Synthroid 50 mcg (0.05 mg) oral tablet: 1 tab(s) orally once a day  test strips (per patient&#x27;s insurance): 1 application subcutaneously 4 times a day. ** Compatible with patient&#x27;s glucometer **  Toprol- mg oral tablet, extended release: 1 tab(s) orally once a day  ZyrTEC 10 mg oral tablet: 1 tab(s) orally once a day   acetaminophen 325 mg oral tablet: 2 tab(s) orally every 6 hours, As needed, Temp greater or equal to 38.5C (101.3F), Mild Pain (1 - 3)  alcohol swabs : Apply topically to affected area 4 times a day   Allegra 180 mg oral tablet: 1 tab(s) orally once a day  Basaglar KwikPen 100 units/mL subcutaneous solution: 40 unit(s) subcutaneous once a day ( take in the am)  Cozaar 100 mg oral tablet: 1 tab(s) orally once a day  Farxiga 5 mg oral tablet: 1 tab(s) orally once a day  Insulin Pen Needles, 4mm: 1 application subcutaneously 4 times a day. ** Use with insulin pen **   lancets: 1 application subcutaneously 4 times a day   Lasix 40 mg oral tablet: 1 tab(s) orally once a day  NovoLOG 100 units/mL injectable solution: 30 unit(s) injectable 3 times a day (with meals)  omeprazole 40 mg oral delayed release capsule: 1 cap(s) orally once a day  polyethylene glycol 3350 oral powder for reconstitution: 17 gram(s) orally once a day as needed for constipation   Synthroid 50 mcg (0.05 mg) oral tablet: 1 tab(s) orally once a day  test strips (per patient&#x27;s insurance): 1 application subcutaneously 4 times a day. ** Compatible with patient&#x27;s glucometer **  Toprol- mg oral tablet, extended release: 1 tab(s) orally once a day  ZyrTEC 10 mg oral tablet: 1 tab(s) orally once a day   acetaminophen 325 mg oral tablet: 2 tab(s) orally every 6 hours, As needed, Temp greater or equal to 38.5C (101.3F), Mild Pain (1 - 3)  alcohol swabs : Apply topically to affected area 4 times a day   Basaglar KwikPen 100 units/mL subcutaneous solution: 40 unit(s) subcutaneous once a day ( take in the am)  Insulin Pen Needles, 4mm: 1 application subcutaneously 4 times a day. ** Use with insulin pen **   lancets: 1 application subcutaneously 4 times a day   Lasix 40 mg oral tablet: 1 tab(s) orally once a day  NovoLOG 100 units/mL injectable solution: 30 unit(s) injectable 3 times a day (with meals)  omeprazole 40 mg oral delayed release capsule: 1 cap(s) orally once a day  polyethylene glycol 3350 oral powder for reconstitution: 17 gram(s) orally once a day as needed for constipation   Synthroid 50 mcg (0.05 mg) oral tablet: 1 tab(s) orally once a day  test strips (per patient&#x27;s insurance): 1 application subcutaneously 4 times a day. ** Compatible with patient&#x27;s glucometer **  Toprol- mg oral tablet, extended release: 1 tab(s) orally once a day

## 2021-07-28 NOTE — PROVIDER CONTACT NOTE (CRITICAL VALUE NOTIFICATION) - SITUATION
blood glucose serum level 614.
Patients blood glucose resulted at 543 on BMP
jaqueline watt glucose serum level was 492.

## 2021-07-28 NOTE — DISCHARGE NOTE PROVIDER - NSDCFUADDINST_GEN_ALL_CORE_FT
If patient prefers to take Night dosing, then would hold basaglar tonight and resume tomorrow night.  However,   expect blood glucose to be elevated if planning to transition to night dosing   If patient prefers to take Night dosing, then would hold basaglar tonight and resume tomorrow night.  However, expect blood glucose to be elevated if planning to transition to night dosing.

## 2021-07-28 NOTE — PROGRESS NOTE ADULT - PROBLEM SELECTOR PLAN 4
Reports taking 30 Novolog bid before lunch and dinner as well as basaglar 30 qhs. Despite not getting insulin in ed, and receiving Decadron, FS modest 115. will give 15 Lantus x1, and mod ISS , assess response.  - Glucose levels 500s, BHB mildly elevated, Gap closed. Endo consulted, appreciate recommendations.   - ENDO consulted, follow up recommendations.   - Lantus 35 Units AM, Admelog 15 Units TID, Mod ISS, POC monitoring.

## 2021-07-28 NOTE — PROVIDER CONTACT NOTE (CRITICAL VALUE NOTIFICATION) - ACTION/TREATMENT ORDERED:
Message given to Evans LARSEN. [id number]. acp aware
CHAVA Krueger aware of Blood glucose level increasing throughout the day. will give insulin according to sliding scale. will continue to monitor.
Give ordered NPH

## 2021-07-28 NOTE — PROVIDER CONTACT NOTE (OTHER) - BACKGROUND
Patient admitted for COVID.
Patient admitted for COVID.
admitted to [facility] for + Covid.
Patient has been hyperglycemic. Getting steroids for treatment of covid.
Patient admitted for Covid. Hyperglycemic on admission.

## 2021-07-28 NOTE — DISCHARGE NOTE PROVIDER - NSDCHC_MEDRECSTATUS_GEN_ALL_CORE
Admission Reconciliation is Completed  Discharge Reconciliation is Not Complete No Admission Reconciliation is Completed  Discharge Reconciliation is Completed

## 2021-07-28 NOTE — PROGRESS NOTE ADULT - SUBJECTIVE AND OBJECTIVE BOX
PROGRESS NOTE:     Patient is a 76y old  Female who presents with a chief complaint of covid (28 Jul 2021 09:37)    SUBJECTIVE / OVERNIGHT EVENTS: Patient seen and evaluated at bedside. NO acute distress evident, no overnight events. Reports feeling well, no sob, cp, abd pain, n/v, dizziness, palpitations.     ADDITIONAL REVIEW OF SYSTEMS:    MEDICATIONS  (STANDING):  dexAMETHasone  Injectable 6 milliGRAM(s) IV Push daily  dextrose 40% Gel 15 Gram(s) Oral once  dextrose 5%. 1000 milliLiter(s) (50 mL/Hr) IV Continuous <Continuous>  dextrose 5%. 1000 milliLiter(s) (100 mL/Hr) IV Continuous <Continuous>  dextrose 5%. 1000 milliLiter(s) (100 mL/Hr) IV Continuous <Continuous>  dextrose 5%. 1000 milliLiter(s) (100 mL/Hr) IV Continuous <Continuous>  dextrose 50% Injectable 25 Gram(s) IV Push once  dextrose 50% Injectable 12.5 Gram(s) IV Push once  dextrose 50% Injectable 25 Gram(s) IV Push once  enoxaparin Injectable 40 milliGRAM(s) SubCutaneous every 12 hours  furosemide    Tablet 40 milliGRAM(s) Oral daily  glucagon  Injectable 1 milliGRAM(s) IntraMuscular once  glucagon  Injectable 1 milliGRAM(s) IntraMuscular once  glucagon  Injectable 1 milliGRAM(s) IntraMuscular once  insulin glargine Injectable (LANTUS) 35 Unit(s) SubCutaneous every morning  insulin lispro (ADMELOG) corrective regimen sliding scale   SubCutaneous three times a day before meals  insulin lispro (ADMELOG) corrective regimen sliding scale   SubCutaneous at bedtime  insulin lispro Injectable (ADMELOG) 15 Unit(s) SubCutaneous three times a day before meals  levothyroxine 50 MICROGram(s) Oral daily  metoprolol succinate  milliGRAM(s) Oral daily  pantoprazole    Tablet 40 milliGRAM(s) Oral before breakfast  remdesivir  IVPB 100 milliGRAM(s) IV Intermittent every 24 hours  remdesivir  IVPB   IV Intermittent     MEDICATIONS  (PRN):  acetaminophen   Tablet .. 650 milliGRAM(s) Oral every 6 hours PRN Temp greater or equal to 38.5C (101.3F), Mild Pain (1 - 3)  aluminum hydroxide/magnesium hydroxide/simethicone Suspension 30 milliLiter(s) Oral every 4 hours PRN Dyspepsia  benzonatate 200 milliGRAM(s) Oral every 8 hours PRN Cough  melatonin 3 milliGRAM(s) Oral at bedtime PRN Insomnia  ondansetron Injectable 4 milliGRAM(s) IV Push every 8 hours PRN Nausea and/or Vomiting    CAPILLARY BLOOD GLUCOSE  POCT Blood Glucose.: 245 mg/dL (28 Jul 2021 06:50)  POCT Blood Glucose.: 279 mg/dL (28 Jul 2021 05:32)  POCT Blood Glucose.: 378 mg/dL (28 Jul 2021 00:23)  POCT Blood Glucose.: 492 mg/dL (27 Jul 2021 18:34)  POCT Blood Glucose.: 531 mg/dL (27 Jul 2021 17:13)  POCT Blood Glucose.: 588 mg/dL (27 Jul 2021 13:36)  POCT Blood Glucose.: 587 mg/dL (27 Jul 2021 11:40)  POCT Blood Glucose.: 555 mg/dL (27 Jul 2021 11:39)    I&O's Summary    PHYSICAL EXAM:  Vital Signs Last 24 Hrs  T(C): 36.4 (28 Jul 2021 07:10), Max: 36.7 (27 Jul 2021 12:00)  T(F): 97.5 (28 Jul 2021 07:10), Max: 98.1 (27 Jul 2021 19:10)  HR: 62 (28 Jul 2021 07:10) (62 - 77)  BP: 132/63 (28 Jul 2021 07:10) (116/50 - 140/66)  BP(mean): --  RR: 16 (28 Jul 2021 07:54) (16 - 17)  SpO2: 96% (28 Jul 2021 07:54) (93% - 100%)    CONSTITUTIONAL: NAD, well-developed, elderly, obese, comfortable.   RESPIRATORY: Normal respiratory effort; lungs are clear to auscultation bilaterally  CARDIOVASCULAR: Regular rate and rhythm, normal S1 and S2,   No lower extremity edema; Peripheral pulses are 2+ bilaterally  ABDOMEN: Nontender to palpation, normoactive bowel sounds  MUSCLOSKELETAL: no clubbing or cyanosis of digits; no joint swelling or tenderness to palpation  PSYCH: A+O to person, place, and time; affect appropriate    LABS: reviewed.                         12.6   9.00  )-----------( 337      ( 28 Jul 2021 07:37 )             40.3     07-28    137  |  97<L>  |  41<H>  ----------------------------<  258<H>  4.5   |  25  |  1.35<H>    Ca    9.4      28 Jul 2021 07:37  Phos  4.3     07-27  Mg     2.30     07-28    TPro  7.2  /  Alb  3.8  /  TBili  <0.2  /  DBili  x   /  AST  15  /  ALT  16  /  AlkPhos  129<H>  07-28    PT/INR - ( 27 Jul 2021 07:53 )   PT: 12.0 sec;   INR: 1.05 ratio      PTT - ( 27 Jul 2021 07:53 )  PTT:32.1 sec    RADIOLOGY & ADDITIONAL TESTS:  Results Reviewed:   Imaging Personally Reviewed:  Electrocardiogram Personally Reviewed:    COORDINATION OF CARE:  Care Discussed with Consultants/Other Providers [Y/N]:  Prior or Outpatient Records Reviewed [Y/N]:

## 2021-07-28 NOTE — PROVIDER CONTACT NOTE (OTHER) - ASSESSMENT
No complaints of headache, vision changes of dizziness. Patient is asymptomatic
Patient in no acute distress. Asymptomatic at this time.
blood glucose 589. no s/s of distress at this time.
Patient in no acute distress, asymptomatic at this time.
No complaints of headache, visual changes or dizziness.

## 2021-07-28 NOTE — PROVIDER CONTACT NOTE (OTHER) - SITUATION
Patients blood glucose is 511
FS prior to NPH administration 446.
Bedtime 
Patients blood glucose is 455.
patient Priya Garcia BG level 589.

## 2021-07-28 NOTE — PROGRESS NOTE ADULT - SUBJECTIVE AND OBJECTIVE BOX
Chief Complaint:     History:    MEDICATIONS  (STANDING):  dexAMETHasone  Injectable 6 milliGRAM(s) IV Push daily  dextrose 40% Gel 15 Gram(s) Oral once  dextrose 5%. 1000 milliLiter(s) (50 mL/Hr) IV Continuous <Continuous>  dextrose 5%. 1000 milliLiter(s) (100 mL/Hr) IV Continuous <Continuous>  dextrose 5%. 1000 milliLiter(s) (100 mL/Hr) IV Continuous <Continuous>  dextrose 5%. 1000 milliLiter(s) (100 mL/Hr) IV Continuous <Continuous>  dextrose 50% Injectable 25 Gram(s) IV Push once  dextrose 50% Injectable 12.5 Gram(s) IV Push once  dextrose 50% Injectable 25 Gram(s) IV Push once  enoxaparin Injectable 40 milliGRAM(s) SubCutaneous every 12 hours  furosemide    Tablet 40 milliGRAM(s) Oral daily  glucagon  Injectable 1 milliGRAM(s) IntraMuscular once  glucagon  Injectable 1 milliGRAM(s) IntraMuscular once  glucagon  Injectable 1 milliGRAM(s) IntraMuscular once  insulin glargine Injectable (LANTUS) 35 Unit(s) SubCutaneous every morning  insulin lispro (ADMELOG) corrective regimen sliding scale   SubCutaneous three times a day before meals  insulin lispro (ADMELOG) corrective regimen sliding scale   SubCutaneous at bedtime  insulin lispro Injectable (ADMELOG) 15 Unit(s) SubCutaneous three times a day before meals  levothyroxine 50 MICROGram(s) Oral daily  metoprolol succinate  milliGRAM(s) Oral daily  pantoprazole    Tablet 40 milliGRAM(s) Oral before breakfast  remdesivir  IVPB 100 milliGRAM(s) IV Intermittent every 24 hours  remdesivir  IVPB   IV Intermittent     MEDICATIONS  (PRN):  acetaminophen   Tablet .. 650 milliGRAM(s) Oral every 6 hours PRN Temp greater or equal to 38.5C (101.3F), Mild Pain (1 - 3)  aluminum hydroxide/magnesium hydroxide/simethicone Suspension 30 milliLiter(s) Oral every 4 hours PRN Dyspepsia  benzonatate 200 milliGRAM(s) Oral every 8 hours PRN Cough  melatonin 3 milliGRAM(s) Oral at bedtime PRN Insomnia  ondansetron Injectable 4 milliGRAM(s) IV Push every 8 hours PRN Nausea and/or Vomiting      Allergies    No Known Allergies    Intolerances      Review of Systems:  Constitutional: No fever  Eyes: No blurry vision  Neuro: No tremors  HEENT: No pain  Cardiovascular: No chest pain, palpitations  Respiratory: No SOB, no cough  GI: No nausea, vomiting, abdominal pain  : No dysuria  Skin: no rash  Psych: no depression  Endocrine: no polyuria, polydipsia  Hem/lymph: no swelling  Osteoporosis: no fractures    ALL OTHER SYSTEMS REVIEWED AND NEGATIVE    UNABLE TO OBTAIN    PHYSICAL EXAM:  VITALS: T(C): 36.4 (07-28-21 @ 07:10)  T(F): 97.5 (07-28-21 @ 07:10), Max: 98.1 (07-27-21 @ 19:10)  HR: 62 (07-28-21 @ 07:10) (62 - 77)  BP: 132/63 (07-28-21 @ 07:10) (116/50 - 140/66)  RR:  (16 - 17)  SpO2:  (93% - 100%)  Wt(kg): --  GENERAL: NAD, well-groomed, well-developed  EYES: No proptosis, no lid lag, anicteric  HEENT:  Atraumatic, Normocephalic, moist mucous membranes  THYROID: Normal size, no palpable nodules  RESPIRATORY: Clear to auscultation bilaterally; No rales, rhonchi, wheezing, or rubs  CARDIOVASCULAR: Regular rate and rhythm; No murmurs; no peripheral edema  GI: Soft, nontender, non distended, normal bowel sounds  SKIN: Dry, intact, No rashes or lesions  MUSCULOSKELETAL: Full range of motion, normal strength  NEURO: sensation intact, extraocular movements intact, no tremor, normal reflexes  PSYCH: Alert and oriented x 3, normal affect, normal mood  CUSHING'S SIGNS: no striae    POCT Blood Glucose.: 245 mg/dL (07-28-21 @ 06:50)  POCT Blood Glucose.: 279 mg/dL (07-28-21 @ 05:32)  POCT Blood Glucose.: 378 mg/dL (07-28-21 @ 00:23)  POCT Blood Glucose.: 492 mg/dL (07-27-21 @ 18:34)  POCT Blood Glucose.: 531 mg/dL (07-27-21 @ 17:13)  POCT Blood Glucose.: 588 mg/dL (07-27-21 @ 13:36)  POCT Blood Glucose.: 587 mg/dL (07-27-21 @ 11:40)  POCT Blood Glucose.: 555 mg/dL (07-27-21 @ 11:39)  POCT Blood Glucose.: 429 mg/dL (07-27-21 @ 08:40)  POCT Blood Glucose.: 277 mg/dL (07-27-21 @ 02:37)  POCT Blood Glucose.: 119 mg/dL (07-26-21 @ 21:42)      07-28    137  |  97<L>  |  41<H>  ----------------------------<  258<H>  4.5   |  25  |  1.35<H>    EGFR if : 44<L>  EGFR if non : 38<L>    Ca    9.4      07-28  Mg     2.30     07-28  Phos  4.3     07-27    TPro  7.2  /  Alb  3.8  /  TBili  <0.2  /  DBili  x   /  AST  15  /  ALT  16  /  AlkPhos  129<H>  07-28          Thyroid Function Tests:  07-27 @ 07:53 TSH 1.83 FreeT4 -- T3 -- Anti TPO -- Anti Thyroglobulin Ab -- TSI --                           Chief Complaint: T2DM    History: FS improved from yesterday. Patient restarted on CHO diet.     MEDICATIONS  (STANDING):  dexAMETHasone  Injectable 6 milliGRAM(s) IV Push daily  dextrose 40% Gel 15 Gram(s) Oral once  dextrose 5%. 1000 milliLiter(s) (50 mL/Hr) IV Continuous <Continuous>  dextrose 5%. 1000 milliLiter(s) (100 mL/Hr) IV Continuous <Continuous>  dextrose 5%. 1000 milliLiter(s) (100 mL/Hr) IV Continuous <Continuous>  dextrose 5%. 1000 milliLiter(s) (100 mL/Hr) IV Continuous <Continuous>  dextrose 50% Injectable 25 Gram(s) IV Push once  dextrose 50% Injectable 12.5 Gram(s) IV Push once  dextrose 50% Injectable 25 Gram(s) IV Push once  enoxaparin Injectable 40 milliGRAM(s) SubCutaneous every 12 hours  furosemide    Tablet 40 milliGRAM(s) Oral daily  glucagon  Injectable 1 milliGRAM(s) IntraMuscular once  glucagon  Injectable 1 milliGRAM(s) IntraMuscular once  glucagon  Injectable 1 milliGRAM(s) IntraMuscular once  insulin glargine Injectable (LANTUS) 35 Unit(s) SubCutaneous every morning  insulin lispro (ADMELOG) corrective regimen sliding scale   SubCutaneous three times a day before meals  insulin lispro (ADMELOG) corrective regimen sliding scale   SubCutaneous at bedtime  insulin lispro Injectable (ADMELOG) 15 Unit(s) SubCutaneous three times a day before meals  levothyroxine 50 MICROGram(s) Oral daily  metoprolol succinate  milliGRAM(s) Oral daily  pantoprazole    Tablet 40 milliGRAM(s) Oral before breakfast  remdesivir  IVPB 100 milliGRAM(s) IV Intermittent every 24 hours  remdesivir  IVPB   IV Intermittent     MEDICATIONS  (PRN):  acetaminophen   Tablet .. 650 milliGRAM(s) Oral every 6 hours PRN Temp greater or equal to 38.5C (101.3F), Mild Pain (1 - 3)  aluminum hydroxide/magnesium hydroxide/simethicone Suspension 30 milliLiter(s) Oral every 4 hours PRN Dyspepsia  benzonatate 200 milliGRAM(s) Oral every 8 hours PRN Cough  melatonin 3 milliGRAM(s) Oral at bedtime PRN Insomnia  ondansetron Injectable 4 milliGRAM(s) IV Push every 8 hours PRN Nausea and/or Vomiting      Allergies    No Known Allergies    Intolerances      Review of Systems:  Constitutional: No fever  Eyes: No blurry vision  Neuro: No tremors  HEENT: No pain  Cardiovascular: No chest pain, palpitations  Respiratory: No SOB, no cough  GI: No nausea, vomiting, abdominal pain  : No dysuria  Skin: no rash  Psych: no depression  Endocrine: no polyuria, polydipsia      ALL OTHER SYSTEMS REVIEWED AND NEGATIVE        PHYSICAL EXAM:  VITALS: T(C): 36.4 (07-28-21 @ 07:10)  T(F): 97.5 (07-28-21 @ 07:10), Max: 98.1 (07-27-21 @ 19:10)  HR: 62 (07-28-21 @ 07:10) (62 - 77)  BP: 132/63 (07-28-21 @ 07:10) (116/50 - 140/66)  RR:  (16 - 17)  SpO2:  (93% - 100%)  Wt(kg): --  GENERAL: NAD, well-groomed, well-developed  EYES: No proptosis, no lid lag, anicteric  HEENT:  Atraumatic, Normocephalic, moist mucous membranes  RESPIRATORY: Clear to auscultation bilaterally  CARDIOVASCULAR: Regular rate and rhythm  GI: Soft, nontender, non distended, normal bowel sounds      POCT Blood Glucose.: 245 mg/dL (07-28-21 @ 06:50)  POCT Blood Glucose.: 279 mg/dL (07-28-21 @ 05:32)  POCT Blood Glucose.: 378 mg/dL (07-28-21 @ 00:23)  POCT Blood Glucose.: 492 mg/dL (07-27-21 @ 18:34)  POCT Blood Glucose.: 531 mg/dL (07-27-21 @ 17:13)  POCT Blood Glucose.: 588 mg/dL (07-27-21 @ 13:36)  POCT Blood Glucose.: 587 mg/dL (07-27-21 @ 11:40)  POCT Blood Glucose.: 555 mg/dL (07-27-21 @ 11:39)  POCT Blood Glucose.: 429 mg/dL (07-27-21 @ 08:40)  POCT Blood Glucose.: 277 mg/dL (07-27-21 @ 02:37)  POCT Blood Glucose.: 119 mg/dL (07-26-21 @ 21:42)      07-28    137  |  97<L>  |  41<H>  ----------------------------<  258<H>  4.5   |  25  |  1.35<H>    EGFR if : 44<L>  EGFR if non : 38<L>    Ca    9.4      07-28  Mg     2.30     07-28  Phos  4.3     07-27    TPro  7.2  /  Alb  3.8  /  TBili  <0.2  /  DBili  x   /  AST  15  /  ALT  16  /  AlkPhos  129<H>  07-28          Thyroid Function Tests:  07-27 @ 07:53 TSH 1.83 FreeT4 -- T3 -- Anti TPO -- Anti Thyroglobulin Ab -- TSI --

## 2021-07-28 NOTE — DISCHARGE NOTE PROVIDER - HOSPITAL COURSE
A 76 year old female with DM on Insulin, Hypothyroidism, presents with COVID infection. Pt vaccinated with last dose of Moderna end of April. Chest X-ray clear lungs. In ED, pt breathing conformably On 2 L, 100%. Pt was started on Decadron (7/26-7/30) and Remdesivir x5 days (7/27-7/31). Pt is currently saturating well on room air, ambulatory sats 96%, no need for home O2. Hospital course complicated by steroid-indiced hyperglycemia, A1C 9.2, Glucose levels 500s, BHB mildly elevated, Gap closed. Endo consulted for further management and insulin regimen adjusted. Final d/c recommendations to be determined on day of discharge -----    PT: home no needs      Discussed case with _____, on ______, pt cleared for discharge home.   A 76 year old female with DM on Insulin, Hypothyroidism, presents with COVID infection. Pt vaccinated with last dose of Moderna end of April. Chest X-ray clear lungs. In ED, pt breathing conformably On 2 L, 100%. Pt was started on Decadron (7/26-7/30) and Remdesivir x5 days (7/27-7/31). Pt is currently saturating well on room air, ambulatory sats 96%, no need for home O2. Hospital course complicated by steroid-indiced hyperglycemia, A1C 9.2, Glucose levels 500s, BHB mildly elevated, Gap closed. Endo consulted for further management and insulin regimen adjusted.    PT: home no needs      Discussed case with Dr. Calderon, on 7/31/21, pt cleared for discharge home.

## 2021-07-28 NOTE — PROVIDER CONTACT NOTE (CRITICAL VALUE NOTIFICATION) - BACKGROUND
pt admitted for +Covid.
Admitted for Covid. Receiving decadron. Patient has been hyperglycemic
patient admitted for +covid, fever cough, sore throat.

## 2021-07-29 LAB
ALBUMIN SERPL ELPH-MCNC: 4 G/DL — SIGNIFICANT CHANGE UP (ref 3.3–5)
ALP SERPL-CCNC: 131 U/L — HIGH (ref 40–120)
ALT FLD-CCNC: 15 U/L — SIGNIFICANT CHANGE UP (ref 4–33)
ANION GAP SERPL CALC-SCNC: 17 MMOL/L — HIGH (ref 7–14)
AST SERPL-CCNC: 15 U/L — SIGNIFICANT CHANGE UP (ref 4–32)
BILIRUB SERPL-MCNC: <0.2 MG/DL — SIGNIFICANT CHANGE UP (ref 0.2–1.2)
BUN SERPL-MCNC: 46 MG/DL — HIGH (ref 7–23)
CALCIUM SERPL-MCNC: 9.8 MG/DL — SIGNIFICANT CHANGE UP (ref 8.4–10.5)
CHLORIDE SERPL-SCNC: 98 MMOL/L — SIGNIFICANT CHANGE UP (ref 98–107)
CO2 SERPL-SCNC: 24 MMOL/L — SIGNIFICANT CHANGE UP (ref 22–31)
CREAT SERPL-MCNC: 1.31 MG/DL — HIGH (ref 0.5–1.3)
GLUCOSE BLDC GLUCOMTR-MCNC: 247 MG/DL — HIGH (ref 70–99)
GLUCOSE BLDC GLUCOMTR-MCNC: 254 MG/DL — HIGH (ref 70–99)
GLUCOSE BLDC GLUCOMTR-MCNC: 307 MG/DL — HIGH (ref 70–99)
GLUCOSE BLDC GLUCOMTR-MCNC: 346 MG/DL — HIGH (ref 70–99)
GLUCOSE BLDC GLUCOMTR-MCNC: 347 MG/DL — HIGH (ref 70–99)
GLUCOSE SERPL-MCNC: 190 MG/DL — HIGH (ref 70–99)
HCT VFR BLD CALC: 41 % — SIGNIFICANT CHANGE UP (ref 34.5–45)
HGB BLD-MCNC: 13.1 G/DL — SIGNIFICANT CHANGE UP (ref 11.5–15.5)
MCHC RBC-ENTMCNC: 26.9 PG — LOW (ref 27–34)
MCHC RBC-ENTMCNC: 32 GM/DL — SIGNIFICANT CHANGE UP (ref 32–36)
MCV RBC AUTO: 84.2 FL — SIGNIFICANT CHANGE UP (ref 80–100)
NRBC # BLD: 0 /100 WBCS — SIGNIFICANT CHANGE UP
NRBC # FLD: 0 K/UL — SIGNIFICANT CHANGE UP
PLATELET # BLD AUTO: 416 K/UL — HIGH (ref 150–400)
POTASSIUM SERPL-MCNC: 4.3 MMOL/L — SIGNIFICANT CHANGE UP (ref 3.5–5.3)
POTASSIUM SERPL-SCNC: 4.3 MMOL/L — SIGNIFICANT CHANGE UP (ref 3.5–5.3)
PROT SERPL-MCNC: 7.5 G/DL — SIGNIFICANT CHANGE UP (ref 6–8.3)
RBC # BLD: 4.87 M/UL — SIGNIFICANT CHANGE UP (ref 3.8–5.2)
RBC # FLD: 14.6 % — HIGH (ref 10.3–14.5)
SODIUM SERPL-SCNC: 139 MMOL/L — SIGNIFICANT CHANGE UP (ref 135–145)
WBC # BLD: 12.89 K/UL — HIGH (ref 3.8–10.5)
WBC # FLD AUTO: 12.89 K/UL — HIGH (ref 3.8–10.5)

## 2021-07-29 PROCEDURE — 99233 SBSQ HOSP IP/OBS HIGH 50: CPT

## 2021-07-29 RX ORDER — INSULIN LISPRO 100/ML
28 VIAL (ML) SUBCUTANEOUS
Refills: 0 | Status: DISCONTINUED | OUTPATIENT
Start: 2021-07-29 | End: 2021-07-30

## 2021-07-29 RX ORDER — HEPARIN SODIUM 5000 [USP'U]/ML
5000 INJECTION INTRAVENOUS; SUBCUTANEOUS EVERY 8 HOURS
Refills: 0 | Status: DISCONTINUED | OUTPATIENT
Start: 2021-07-29 | End: 2021-07-31

## 2021-07-29 RX ORDER — LEVOTHYROXINE SODIUM 125 MCG
1 TABLET ORAL
Qty: 0 | Refills: 0 | DISCHARGE

## 2021-07-29 RX ORDER — FUROSEMIDE 40 MG
1 TABLET ORAL
Qty: 0 | Refills: 0 | DISCHARGE

## 2021-07-29 RX ORDER — INSULIN GLARGINE 100 [IU]/ML
50 INJECTION, SOLUTION SUBCUTANEOUS EVERY MORNING
Refills: 0 | Status: DISCONTINUED | OUTPATIENT
Start: 2021-07-29 | End: 2021-07-31

## 2021-07-29 RX ORDER — METOPROLOL TARTRATE 50 MG
1 TABLET ORAL
Qty: 0 | Refills: 0 | DISCHARGE

## 2021-07-29 RX ORDER — OMEPRAZOLE 10 MG/1
1 CAPSULE, DELAYED RELEASE ORAL
Qty: 0 | Refills: 0 | DISCHARGE

## 2021-07-29 RX ADMIN — Medication 40 MILLIGRAM(S): at 05:22

## 2021-07-29 RX ADMIN — INSULIN GLARGINE 50 UNIT(S): 100 INJECTION, SOLUTION SUBCUTANEOUS at 08:06

## 2021-07-29 RX ADMIN — Medication 4: at 08:07

## 2021-07-29 RX ADMIN — Medication 28 UNIT(S): at 17:20

## 2021-07-29 RX ADMIN — ENOXAPARIN SODIUM 40 MILLIGRAM(S): 100 INJECTION SUBCUTANEOUS at 05:21

## 2021-07-29 RX ADMIN — REMDESIVIR 500 MILLIGRAM(S): 5 INJECTION INTRAVENOUS at 07:12

## 2021-07-29 RX ADMIN — Medication 8: at 17:20

## 2021-07-29 RX ADMIN — Medication 100 MILLIGRAM(S): at 05:22

## 2021-07-29 RX ADMIN — Medication 8: at 12:26

## 2021-07-29 RX ADMIN — HEPARIN SODIUM 5000 UNIT(S): 5000 INJECTION INTRAVENOUS; SUBCUTANEOUS at 14:08

## 2021-07-29 RX ADMIN — Medication 24 UNIT(S): at 12:26

## 2021-07-29 RX ADMIN — Medication 4: at 21:03

## 2021-07-29 RX ADMIN — HEPARIN SODIUM 5000 UNIT(S): 5000 INJECTION INTRAVENOUS; SUBCUTANEOUS at 21:04

## 2021-07-29 RX ADMIN — PANTOPRAZOLE SODIUM 40 MILLIGRAM(S): 20 TABLET, DELAYED RELEASE ORAL at 05:22

## 2021-07-29 RX ADMIN — Medication 24 UNIT(S): at 08:07

## 2021-07-29 RX ADMIN — Medication 6 MILLIGRAM(S): at 05:22

## 2021-07-29 RX ADMIN — Medication 50 MICROGRAM(S): at 05:22

## 2021-07-29 NOTE — PROGRESS NOTE ADULT - SUBJECTIVE AND OBJECTIVE BOX
PROGRESS NOTE:     Patient is a 76y old  Female who presents with a chief complaint of covid (28 Jul 2021 12:28)    SUBJECTIVE / OVERNIGHT EVENTS: Patient seen and evaluated at bedside. No acute distress evident, no overnight events. Reports feeling well, persistently coughing with slight SOB, but no cp, abd pain, nausea, vomiting or further complaints.     ADDITIONAL REVIEW OF SYSTEMS:    MEDICATIONS  (STANDING):  dexAMETHasone  Injectable 6 milliGRAM(s) IV Push daily  dextrose 40% Gel 15 Gram(s) Oral once  dextrose 5%. 1000 milliLiter(s) (50 mL/Hr) IV Continuous <Continuous>  dextrose 5%. 1000 milliLiter(s) (100 mL/Hr) IV Continuous <Continuous>  dextrose 5%. 1000 milliLiter(s) (100 mL/Hr) IV Continuous <Continuous>  dextrose 5%. 1000 milliLiter(s) (100 mL/Hr) IV Continuous <Continuous>  dextrose 50% Injectable 25 Gram(s) IV Push once  dextrose 50% Injectable 12.5 Gram(s) IV Push once  dextrose 50% Injectable 25 Gram(s) IV Push once  furosemide    Tablet 40 milliGRAM(s) Oral daily  glucagon  Injectable 1 milliGRAM(s) IntraMuscular once  glucagon  Injectable 1 milliGRAM(s) IntraMuscular once  glucagon  Injectable 1 milliGRAM(s) IntraMuscular once  heparin   Injectable 5000 Unit(s) SubCutaneous every 8 hours  insulin glargine Injectable (LANTUS) 50 Unit(s) SubCutaneous every morning  insulin lispro (ADMELOG) corrective regimen sliding scale   SubCutaneous three times a day before meals  insulin lispro (ADMELOG) corrective regimen sliding scale   SubCutaneous at bedtime  insulin lispro Injectable (ADMELOG) 24 Unit(s) SubCutaneous three times a day before meals  levothyroxine 50 MICROGram(s) Oral daily  metoprolol succinate  milliGRAM(s) Oral daily  pantoprazole    Tablet 40 milliGRAM(s) Oral before breakfast  remdesivir  IVPB 100 milliGRAM(s) IV Intermittent every 24 hours  remdesivir  IVPB   IV Intermittent     MEDICATIONS  (PRN):  acetaminophen   Tablet .. 650 milliGRAM(s) Oral every 6 hours PRN Temp greater or equal to 38.5C (101.3F), Mild Pain (1 - 3)  aluminum hydroxide/magnesium hydroxide/simethicone Suspension 30 milliLiter(s) Oral every 4 hours PRN Dyspepsia  benzonatate 200 milliGRAM(s) Oral every 8 hours PRN Cough  melatonin 3 milliGRAM(s) Oral at bedtime PRN Insomnia  ondansetron Injectable 4 milliGRAM(s) IV Push every 8 hours PRN Nausea and/or Vomiting    CAPILLARY BLOOD GLUCOSE  POCT Blood Glucose.: 254 mg/dL (29 Jul 2021 08:31)  POCT Blood Glucose.: 247 mg/dL (29 Jul 2021 07:54)  POCT Blood Glucose.: 415 mg/dL (28 Jul 2021 22:01)  POCT Blood Glucose.: 446 mg/dL (28 Jul 2021 20:41)  POCT Blood Glucose.: 511 mg/dL (28 Jul 2021 17:28)  POCT Blood Glucose.: 459 mg/dL (28 Jul 2021 17:27)  POCT Blood Glucose.: 455 mg/dL (28 Jul 2021 12:04)    I&O's Summary    PHYSICAL EXAM:  Vital Signs Last 24 Hrs  T(C): 36.4 (29 Jul 2021 05:10), Max: 36.7 (28 Jul 2021 21:02)  T(F): 97.6 (29 Jul 2021 05:10), Max: 98.1 (28 Jul 2021 21:02)  HR: 60 (29 Jul 2021 05:10) (60 - 74)  BP: 139/81 (29 Jul 2021 05:10) (136/71 - 145/67)  BP(mean): --  RR: 19 (29 Jul 2021 05:10) (18 - 19)  SpO2: 96% (29 Jul 2021 05:10) (94% - 97%)    CONSTITUTIONAL: NAD, well-developed, comfortable appearing.   RESPIRATORY: Normal respiratory effort; lungs are clear to auscultation bilaterally  CARDIOVASCULAR: Regular rate and rhythm, normal S1 and S2,  No lower extremity edema; Peripheral pulses are 2+ bilaterally  ABDOMEN: Nontender to palpation, normoactive bowel sounds, no rebound/guarding; No hepatosplenomegaly  MUSCLOSKELETAL: no clubbing or cyanosis of digits; no joint swelling or tenderness to palpation  PSYCH: A+O to person, place, and time; affect appropriate    LABS: reviewed.                         13.1   12.89 )-----------( 416      ( 29 Jul 2021 07:28 )             41.0     07-29    139  |  98  |  46<H>  ----------------------------<  190<H>  4.3   |  24  |  1.31<H>    Ca    9.8      29 Jul 2021 07:28  Phos  3.4     07-28  Mg     2.10     07-28    TPro  7.5  /  Alb  4.0  /  TBili  <0.2  /  DBili  x   /  AST  15  /  ALT  15  /  AlkPhos  131<H>  07-29    RADIOLOGY & ADDITIONAL TESTS:  Results Reviewed:   Imaging Personally Reviewed:  Electrocardiogram Personally Reviewed:    COORDINATION OF CARE:  Care Discussed with Consultants/Other Providers [Y/N]:  Prior or Outpatient Records Reviewed [Y/N]:

## 2021-07-29 NOTE — PROGRESS NOTE ADULT - PROBLEM SELECTOR PLAN 4
Reports taking 30 Novolog bid before lunch and dinner as well as basaglar 30 qhs. Despite not getting insulin in ed, and receiving Decadron, FS modest 115. will give 15 Lantus x1, and mod ISS , assess response.  - Glucose levels 500s, BHB mildly elevated, Gap closed. Endo consulted, appreciate recommendations.   - ENDO consulted, follow up recommendations.   - Lantus 40 Units AM, Admelog 22 Units TID, Mod ISS, POC monitoring.

## 2021-07-30 ENCOUNTER — TRANSCRIPTION ENCOUNTER (OUTPATIENT)
Age: 76
End: 2021-07-30

## 2021-07-30 DIAGNOSIS — K59.00 CONSTIPATION, UNSPECIFIED: ICD-10-CM

## 2021-07-30 DIAGNOSIS — R73.9 HYPERGLYCEMIA, UNSPECIFIED: ICD-10-CM

## 2021-07-30 LAB
ALBUMIN SERPL ELPH-MCNC: 3.6 G/DL — SIGNIFICANT CHANGE UP (ref 3.3–5)
ALP SERPL-CCNC: 107 U/L — SIGNIFICANT CHANGE UP (ref 40–120)
ALT FLD-CCNC: 17 U/L — SIGNIFICANT CHANGE UP (ref 4–33)
ANION GAP SERPL CALC-SCNC: 15 MMOL/L — HIGH (ref 7–14)
AST SERPL-CCNC: 18 U/L — SIGNIFICANT CHANGE UP (ref 4–32)
BILIRUB SERPL-MCNC: <0.2 MG/DL — SIGNIFICANT CHANGE UP (ref 0.2–1.2)
BUN SERPL-MCNC: 44 MG/DL — HIGH (ref 7–23)
CALCIUM SERPL-MCNC: 9.4 MG/DL — SIGNIFICANT CHANGE UP (ref 8.4–10.5)
CHLORIDE SERPL-SCNC: 101 MMOL/L — SIGNIFICANT CHANGE UP (ref 98–107)
CO2 SERPL-SCNC: 23 MMOL/L — SIGNIFICANT CHANGE UP (ref 22–31)
CREAT SERPL-MCNC: 1.2 MG/DL — SIGNIFICANT CHANGE UP (ref 0.5–1.3)
CRP SERPL-MCNC: 6 MG/L — HIGH
D DIMER BLD IA.RAPID-MCNC: 199 NG/ML DDU — SIGNIFICANT CHANGE UP
FERRITIN SERPL-MCNC: 209 NG/ML — HIGH (ref 15–150)
GLUCOSE BLDC GLUCOMTR-MCNC: 183 MG/DL — HIGH (ref 70–99)
GLUCOSE BLDC GLUCOMTR-MCNC: 241 MG/DL — HIGH (ref 70–99)
GLUCOSE BLDC GLUCOMTR-MCNC: 250 MG/DL — HIGH (ref 70–99)
GLUCOSE BLDC GLUCOMTR-MCNC: 320 MG/DL — HIGH (ref 70–99)
GLUCOSE SERPL-MCNC: 156 MG/DL — HIGH (ref 70–99)
HCT VFR BLD CALC: 40.9 % — SIGNIFICANT CHANGE UP (ref 34.5–45)
HGB BLD-MCNC: 12.7 G/DL — SIGNIFICANT CHANGE UP (ref 11.5–15.5)
LDH SERPL L TO P-CCNC: 182 U/L — SIGNIFICANT CHANGE UP (ref 135–225)
MCHC RBC-ENTMCNC: 27.1 PG — SIGNIFICANT CHANGE UP (ref 27–34)
MCHC RBC-ENTMCNC: 31.1 GM/DL — LOW (ref 32–36)
MCV RBC AUTO: 87.2 FL — SIGNIFICANT CHANGE UP (ref 80–100)
NRBC # BLD: 0 /100 WBCS — SIGNIFICANT CHANGE UP
NRBC # FLD: 0 K/UL — SIGNIFICANT CHANGE UP
PLATELET # BLD AUTO: 349 K/UL — SIGNIFICANT CHANGE UP (ref 150–400)
POTASSIUM SERPL-MCNC: 4.2 MMOL/L — SIGNIFICANT CHANGE UP (ref 3.5–5.3)
POTASSIUM SERPL-SCNC: 4.2 MMOL/L — SIGNIFICANT CHANGE UP (ref 3.5–5.3)
PROT SERPL-MCNC: 7.1 G/DL — SIGNIFICANT CHANGE UP (ref 6–8.3)
RBC # BLD: 4.69 M/UL — SIGNIFICANT CHANGE UP (ref 3.8–5.2)
RBC # FLD: 14.6 % — HIGH (ref 10.3–14.5)
SODIUM SERPL-SCNC: 139 MMOL/L — SIGNIFICANT CHANGE UP (ref 135–145)
WBC # BLD: 11.82 K/UL — HIGH (ref 3.8–10.5)
WBC # FLD AUTO: 11.82 K/UL — HIGH (ref 3.8–10.5)

## 2021-07-30 PROCEDURE — 99233 SBSQ HOSP IP/OBS HIGH 50: CPT

## 2021-07-30 RX ORDER — ACETAMINOPHEN 500 MG
2 TABLET ORAL
Qty: 0 | Refills: 0 | DISCHARGE
Start: 2021-07-30

## 2021-07-30 RX ORDER — INSULIN LISPRO 100/ML
35 VIAL (ML) SUBCUTANEOUS
Refills: 0 | Status: DISCONTINUED | OUTPATIENT
Start: 2021-07-30 | End: 2021-07-31

## 2021-07-30 RX ORDER — ISOPROPYL ALCOHOL, BENZOCAINE .7; .06 ML/ML; ML/ML
1 SWAB TOPICAL
Qty: 100 | Refills: 1
Start: 2021-07-30 | End: 2021-09-17

## 2021-07-30 RX ORDER — POLYETHYLENE GLYCOL 3350 17 G/17G
17 POWDER, FOR SOLUTION ORAL
Qty: 0 | Refills: 0 | DISCHARGE
Start: 2021-07-30

## 2021-07-30 RX ORDER — POLYETHYLENE GLYCOL 3350 17 G/17G
17 POWDER, FOR SOLUTION ORAL DAILY
Refills: 0 | Status: DISCONTINUED | OUTPATIENT
Start: 2021-07-30 | End: 2021-07-31

## 2021-07-30 RX ADMIN — Medication 35 UNIT(S): at 12:12

## 2021-07-30 RX ADMIN — Medication 35 UNIT(S): at 17:09

## 2021-07-30 RX ADMIN — Medication 4: at 17:09

## 2021-07-30 RX ADMIN — HEPARIN SODIUM 5000 UNIT(S): 5000 INJECTION INTRAVENOUS; SUBCUTANEOUS at 05:20

## 2021-07-30 RX ADMIN — Medication 40 MILLIGRAM(S): at 05:20

## 2021-07-30 RX ADMIN — HEPARIN SODIUM 5000 UNIT(S): 5000 INJECTION INTRAVENOUS; SUBCUTANEOUS at 21:02

## 2021-07-30 RX ADMIN — Medication 100 MILLIGRAM(S): at 05:20

## 2021-07-30 RX ADMIN — HEPARIN SODIUM 5000 UNIT(S): 5000 INJECTION INTRAVENOUS; SUBCUTANEOUS at 12:12

## 2021-07-30 RX ADMIN — INSULIN GLARGINE 50 UNIT(S): 100 INJECTION, SOLUTION SUBCUTANEOUS at 07:46

## 2021-07-30 RX ADMIN — PANTOPRAZOLE SODIUM 40 MILLIGRAM(S): 20 TABLET, DELAYED RELEASE ORAL at 05:19

## 2021-07-30 RX ADMIN — REMDESIVIR 500 MILLIGRAM(S): 5 INJECTION INTRAVENOUS at 07:28

## 2021-07-30 RX ADMIN — Medication 6 MILLIGRAM(S): at 05:20

## 2021-07-30 RX ADMIN — Medication 2: at 08:23

## 2021-07-30 RX ADMIN — Medication 28 UNIT(S): at 08:24

## 2021-07-30 RX ADMIN — Medication 8: at 12:11

## 2021-07-30 RX ADMIN — Medication 50 MICROGRAM(S): at 05:19

## 2021-07-30 NOTE — PROGRESS NOTE ADULT - PROBLEM SELECTOR PLAN 4
Reports taking 30 Novolog bid before lunch and dinner as well as basaglar 30 qhs. Despite not getting insulin in ed, and receiving Decadron, FS modest 115. will give 15 Lantus x1, and mod ISS , assess response.  - Glucose levels 500s, BHB mildly elevated, Gap closed. Endo consulted, appreciate recommendations.   - ENDO consulted, follow up recommendations.   - Lantus 40 Units AM, Admelog 22 Units TID, Mod ISS, POC monitoring.  - NOW OFF STEROIDS, anticipate reduced insulin requirements, f/u Endo recs for DC planning. Miralax, Bisacodyl PRN.  Monitor for BM.   Abd distended, BS + . Later RN reports had LARGE BM.

## 2021-07-30 NOTE — PROGRESS NOTE ADULT - PROBLEM SELECTOR PROBLEM 5
DVT prophylaxis Clear Type 2 diabetes mellitus with other specified complication, unspecified whether long term insulin use

## 2021-07-30 NOTE — PROGRESS NOTE ADULT - PROBLEM SELECTOR PROBLEM 4
Type 2 diabetes mellitus with other specified complication, unspecified whether long term insulin use Constipation

## 2021-07-30 NOTE — DISCHARGE NOTE NURSING/CASE MANAGEMENT/SOCIAL WORK - PATIENT PORTAL LINK FT
You can access the FollowMyHealth Patient Portal offered by [facility] by registering at the following website: [email or web address]. By joining [employer]’s FollowMyHealth portal, you will also be able to view your health information using other applications (apps) compatible with our system.

## 2021-07-30 NOTE — PROGRESS NOTE ADULT - SUBJECTIVE AND OBJECTIVE BOX
PROGRESS NOTE:     Patient is a 76y old  Female who presents with a chief complaint of covid (29 Jul 2021 10:09)    SUBJECTIVE / OVERNIGHT EVENTS: Patient seen and evaluated at bedside. No acute distress evident, no overnight events. Reports feeling well, no sob, chest pain, abdominal pain, nausea, vomiting. Tolerating PO. s/p 5 days decadron, day 4 Remdesivir. Glucose trend stable.     ADDITIONAL REVIEW OF SYSTEMS:    MEDICATIONS  (STANDING):  dexAMETHasone  Injectable 6 milliGRAM(s) IV Push daily  dextrose 40% Gel 15 Gram(s) Oral once  dextrose 5%. 1000 milliLiter(s) (50 mL/Hr) IV Continuous <Continuous>  dextrose 5%. 1000 milliLiter(s) (100 mL/Hr) IV Continuous <Continuous>  dextrose 5%. 1000 milliLiter(s) (100 mL/Hr) IV Continuous <Continuous>  dextrose 5%. 1000 milliLiter(s) (100 mL/Hr) IV Continuous <Continuous>  dextrose 50% Injectable 25 Gram(s) IV Push once  dextrose 50% Injectable 12.5 Gram(s) IV Push once  dextrose 50% Injectable 25 Gram(s) IV Push once  furosemide    Tablet 40 milliGRAM(s) Oral daily  glucagon  Injectable 1 milliGRAM(s) IntraMuscular once  glucagon  Injectable 1 milliGRAM(s) IntraMuscular once  glucagon  Injectable 1 milliGRAM(s) IntraMuscular once  heparin   Injectable 5000 Unit(s) SubCutaneous every 8 hours  insulin glargine Injectable (LANTUS) 50 Unit(s) SubCutaneous every morning  insulin lispro (ADMELOG) corrective regimen sliding scale   SubCutaneous three times a day before meals  insulin lispro (ADMELOG) corrective regimen sliding scale   SubCutaneous at bedtime  insulin lispro Injectable (ADMELOG) 35 Unit(s) SubCutaneous three times a day before meals  levothyroxine 50 MICROGram(s) Oral daily  metoprolol succinate  milliGRAM(s) Oral daily  pantoprazole    Tablet 40 milliGRAM(s) Oral before breakfast  remdesivir  IVPB 100 milliGRAM(s) IV Intermittent every 24 hours  remdesivir  IVPB   IV Intermittent     MEDICATIONS  (PRN):  acetaminophen   Tablet .. 650 milliGRAM(s) Oral every 6 hours PRN Temp greater or equal to 38.5C (101.3F), Mild Pain (1 - 3)  aluminum hydroxide/magnesium hydroxide/simethicone Suspension 30 milliLiter(s) Oral every 4 hours PRN Dyspepsia  benzonatate 200 milliGRAM(s) Oral every 8 hours PRN Cough  melatonin 3 milliGRAM(s) Oral at bedtime PRN Insomnia  ondansetron Injectable 4 milliGRAM(s) IV Push every 8 hours PRN Nausea and/or Vomiting    CAPILLARY BLOOD GLUCOSE    POCT Blood Glucose.: 183 mg/dL (30 Jul 2021 07:36)  POCT Blood Glucose.: 347 mg/dL (29 Jul 2021 20:42)  POCT Blood Glucose.: 307 mg/dL (29 Jul 2021 17:04)  POCT Blood Glucose.: 346 mg/dL (29 Jul 2021 12:01)    I&O's Summary    PHYSICAL EXAM:  Vital Signs Last 24 Hrs  T(C): 36.3 (30 Jul 2021 05:26), Max: 36.6 (29 Jul 2021 12:49)  T(F): 97.4 (30 Jul 2021 05:26), Max: 97.9 (29 Jul 2021 21:19)  HR: 57 (30 Jul 2021 05:26) (57 - 65)  BP: 131/50 (30 Jul 2021 05:26) (131/50 - 158/67)  BP(mean): --  RR: 19 (30 Jul 2021 05:26) (18 - 19)  SpO2: 96% (30 Jul 2021 05:26) (96% - 97%)    CONSTITUTIONAL: NAD, well-developed, comfortable appearing.   RESPIRATORY: Normal respiratory effort; lungs are clear to auscultation bilaterally  CARDIOVASCULAR: Regular rate and rhythm, normal S1 and S2,   No lower extremity edema; Peripheral pulses are 2+ bilaterally  ABDOMEN: Nontender to palpation, normoactive bowel sounds,  MUSCLOSKELETAL: no clubbing or cyanosis of digits; no joint swelling or tenderness to palpation  PSYCH: A+O to person, place, and time; affect appropriate    LABS: reviewed,                         12.7   11.82 )-----------( 349      ( 30 Jul 2021 06:37 )             40.9     07-30    139  |  101  |  44<H>  ----------------------------<  156<H>  4.2   |  23  |  1.20    Ca    9.4      30 Jul 2021 06:37  Phos  3.4     07-28  Mg     2.10     07-28    TPro  7.1  /  Alb  3.6  /  TBili  <0.2  /  DBili  x   /  AST  18  /  ALT  17  /  AlkPhos  107  07-30    RADIOLOGY & ADDITIONAL TESTS:  Results Reviewed:   Imaging Personally Reviewed:  Electrocardiogram Personally Reviewed:    COORDINATION OF CARE:  Care Discussed with Consultants/Other Providers [Y/N]:  Prior or Outpatient Records Reviewed [Y/N]:   PROGRESS NOTE:     Patient is a 76y old  Female who presents with a chief complaint of covid (29 Jul 2021 10:09)    SUBJECTIVE / OVERNIGHT EVENTS: Patient seen and evaluated at bedside. No acute distress evident, no overnight events. Reports feeling well, no sob, chest pain, abdominal pain, nausea, vomiting. Tolerating PO. s/p 5 days decadron, day 4 Remdesivir. Glucose trend stable.     ADDITIONAL REVIEW OF SYSTEMS:    MEDICATIONS  (STANDING):  dexAMETHasone  Injectable 6 milliGRAM(s) IV Push daily  dextrose 40% Gel 15 Gram(s) Oral once  dextrose 5%. 1000 milliLiter(s) (50 mL/Hr) IV Continuous <Continuous>  dextrose 5%. 1000 milliLiter(s) (100 mL/Hr) IV Continuous <Continuous>  dextrose 5%. 1000 milliLiter(s) (100 mL/Hr) IV Continuous <Continuous>  dextrose 5%. 1000 milliLiter(s) (100 mL/Hr) IV Continuous <Continuous>  dextrose 50% Injectable 25 Gram(s) IV Push once  dextrose 50% Injectable 12.5 Gram(s) IV Push once  dextrose 50% Injectable 25 Gram(s) IV Push once  furosemide    Tablet 40 milliGRAM(s) Oral daily  glucagon  Injectable 1 milliGRAM(s) IntraMuscular once  glucagon  Injectable 1 milliGRAM(s) IntraMuscular once  glucagon  Injectable 1 milliGRAM(s) IntraMuscular once  heparin   Injectable 5000 Unit(s) SubCutaneous every 8 hours  insulin glargine Injectable (LANTUS) 50 Unit(s) SubCutaneous every morning  insulin lispro (ADMELOG) corrective regimen sliding scale   SubCutaneous three times a day before meals  insulin lispro (ADMELOG) corrective regimen sliding scale   SubCutaneous at bedtime  insulin lispro Injectable (ADMELOG) 35 Unit(s) SubCutaneous three times a day before meals  levothyroxine 50 MICROGram(s) Oral daily  metoprolol succinate  milliGRAM(s) Oral daily  pantoprazole    Tablet 40 milliGRAM(s) Oral before breakfast  remdesivir  IVPB 100 milliGRAM(s) IV Intermittent every 24 hours  remdesivir  IVPB   IV Intermittent     MEDICATIONS  (PRN):  acetaminophen   Tablet .. 650 milliGRAM(s) Oral every 6 hours PRN Temp greater or equal to 38.5C (101.3F), Mild Pain (1 - 3)  aluminum hydroxide/magnesium hydroxide/simethicone Suspension 30 milliLiter(s) Oral every 4 hours PRN Dyspepsia  benzonatate 200 milliGRAM(s) Oral every 8 hours PRN Cough  melatonin 3 milliGRAM(s) Oral at bedtime PRN Insomnia  ondansetron Injectable 4 milliGRAM(s) IV Push every 8 hours PRN Nausea and/or Vomiting    CAPILLARY BLOOD GLUCOSE    POCT Blood Glucose.: 183 mg/dL (30 Jul 2021 07:36)  POCT Blood Glucose.: 347 mg/dL (29 Jul 2021 20:42)  POCT Blood Glucose.: 307 mg/dL (29 Jul 2021 17:04)  POCT Blood Glucose.: 346 mg/dL (29 Jul 2021 12:01)    I&O's Summary    PHYSICAL EXAM:  Vital Signs Last 24 Hrs  T(C): 36.3 (30 Jul 2021 05:26), Max: 36.6 (29 Jul 2021 12:49)  T(F): 97.4 (30 Jul 2021 05:26), Max: 97.9 (29 Jul 2021 21:19)  HR: 57 (30 Jul 2021 05:26) (57 - 65)  BP: 131/50 (30 Jul 2021 05:26) (131/50 - 158/67)  BP(mean): --  RR: 19 (30 Jul 2021 05:26) (18 - 19)  SpO2: 96% (30 Jul 2021 05:26) (96% - 97%)    CONSTITUTIONAL: NAD, well-developed, comfortable appearing.   RESPIRATORY: Normal respiratory effort; lungs are clear to auscultation bilaterally  CARDIOVASCULAR: Regular rate and rhythm, normal S1 and S2,   No lower extremity edema; Peripheral pulses are 2+ bilaterally  ABDOMEN: Nontender to palpation, normoactive bowel sounds, distended.   MUSCLOSKELETAL: no clubbing or cyanosis of digits; no joint swelling or tenderness to palpation  PSYCH: A+O to person, place, and time; affect appropriate    LABS: reviewed,                         12.7   11.82 )-----------( 349      ( 30 Jul 2021 06:37 )             40.9     07-30    139  |  101  |  44<H>  ----------------------------<  156<H>  4.2   |  23  |  1.20    Ca    9.4      30 Jul 2021 06:37  Phos  3.4     07-28  Mg     2.10     07-28    TPro  7.1  /  Alb  3.6  /  TBili  <0.2  /  DBili  x   /  AST  18  /  ALT  17  /  AlkPhos  107  07-30    RADIOLOGY & ADDITIONAL TESTS:  Results Reviewed:   Imaging Personally Reviewed:  Electrocardiogram Personally Reviewed:    COORDINATION OF CARE:  Care Discussed with Consultants/Other Providers [Y/N]:  Prior or Outpatient Records Reviewed [Y/N]:   EOMI; PERRL; no drainage or redness

## 2021-07-30 NOTE — PHYSICAL THERAPY INITIAL EVALUATION ADULT - ADDITIONAL COMMENTS
Patient lives at home with her family in private home, there are 5 steps to enter and no steps to bedroom/bathroom. Patient was getting assist with ADL prior to admission from family. Patient was using rolling walker prior to admission for ambulation.

## 2021-07-30 NOTE — PROGRESS NOTE ADULT - SUBJECTIVE AND OBJECTIVE BOX
Chief Complaint: DM 2 with hyperglycemia exacerbated by steroids, COVID+    History: Patient seen at bedside. Spoke to her in [place], she reports she is ok, eating meals, no pain. Asking when she will be discharged  Per primary team, patient has 1 more dose of steroids (Dexamethasone 6 mg) and 1 dose Remdesivir left, then possible discharge tomorrow  Granddaughter lives with her/helps her at home and was requesting refill of insulin/supplies  Spoke to granddaughter Kelly at [phone]  Per Kelly, patient takes Basaglar Kwikpen 30 units at bedtime, and Novolog Flexpen 30 units before lunch and before dinner. Also taking Farxiga 5 mg daily  FS values typically 100-115 in the morning, 200s at lunch, variable 100-200 at dinner  High carbohydrate diet reported, lunch is sandwich or plantain with fried egg, dinner is rice and beans and meat  Patient also eats breakfast - typically has crackers or roll and coffee with milk, did not realize she should take insulin before breakfast if blood glucose is in the 100s prior  Recently was on steroid course as outpatient, PCP increased Novolog to 40 units BID  Reviewed that Novolog should be taken before each meal (hold if skips meal), basal insulin is currently scheduled in AM here  Final dosing to be confirmed on day of dc but suggested that prior home regimen be modified to Basaglar 30 units daily, Novolog 20 units TID to balance to 3 meals. Granddaughter reports understanding    MEDICATIONS  (STANDING):  dexAMETHasone  Injectable 6 milliGRAM(s) IV Push daily  dextrose 40% Gel 15 Gram(s) Oral once  dextrose 5%. 1000 milliLiter(s) (50 mL/Hr) IV Continuous <Continuous>  dextrose 5%. 1000 milliLiter(s) (100 mL/Hr) IV Continuous <Continuous>  dextrose 5%. 1000 milliLiter(s) (100 mL/Hr) IV Continuous <Continuous>  dextrose 5%. 1000 milliLiter(s) (100 mL/Hr) IV Continuous <Continuous>  dextrose 50% Injectable 25 Gram(s) IV Push once  dextrose 50% Injectable 12.5 Gram(s) IV Push once  dextrose 50% Injectable 25 Gram(s) IV Push once  furosemide    Tablet 40 milliGRAM(s) Oral daily  glucagon  Injectable 1 milliGRAM(s) IntraMuscular once  glucagon  Injectable 1 milliGRAM(s) IntraMuscular once  glucagon  Injectable 1 milliGRAM(s) IntraMuscular once  heparin   Injectable 5000 Unit(s) SubCutaneous every 8 hours  insulin glargine Injectable (LANTUS) 50 Unit(s) SubCutaneous every morning  insulin lispro (ADMELOG) corrective regimen sliding scale   SubCutaneous three times a day before meals  insulin lispro (ADMELOG) corrective regimen sliding scale   SubCutaneous at bedtime  insulin lispro Injectable (ADMELOG) 35 Unit(s) SubCutaneous three times a day before meals  levothyroxine 50 MICROGram(s) Oral daily  metoprolol succinate  milliGRAM(s) Oral daily  pantoprazole    Tablet 40 milliGRAM(s) Oral before breakfast  remdesivir  IVPB 100 milliGRAM(s) IV Intermittent every 24 hours  remdesivir  IVPB   IV Intermittent     MEDICATIONS  (PRN):  acetaminophen   Tablet .. 650 milliGRAM(s) Oral every 6 hours PRN Temp greater or equal to 38.5C (101.3F), Mild Pain (1 - 3)  aluminum hydroxide/magnesium hydroxide/simethicone Suspension 30 milliLiter(s) Oral every 4 hours PRN Dyspepsia  benzonatate 200 milliGRAM(s) Oral every 8 hours PRN Cough  melatonin 3 milliGRAM(s) Oral at bedtime PRN Insomnia  ondansetron Injectable 4 milliGRAM(s) IV Push every 8 hours PRN Nausea and/or Vomiting    No Known Allergies    Review of Systems:  Cardiovascular: No chest pain  Respiratory: No SOB  GI: No nausea, vomiting  Endocrine: no hypoglycemia     PHYSICAL EXAM:  VITALS: T(C): 36.4 (07-30-21 @ 12:45)  T(F): 97.6 (07-30-21 @ 12:45), Max: 97.9 (07-29-21 @ 21:19)  HR: 66 (07-30-21 @ 12:45) (57 - 66)  BP: 155/74 (07-30-21 @ 12:45) (131/50 - 158/67)  RR:  (18 - 22)  SpO2:  (96% - 97%)  Wt(kg): --  GENERAL: NAD  EYES: No proptosis, no lid lag, anicteric  HEENT:  Atraumatic, Normocephalic, moist mucous membranes  RESPIRATORY: unlabored respirations     CAPILLARY BLOOD GLUCOSE    POCT Blood Glucose.: 320 mg/dL (30 Jul 2021 11:56)  POCT Blood Glucose.: 183 mg/dL (30 Jul 2021 07:36)  POCT Blood Glucose.: 347 mg/dL (29 Jul 2021 20:42)  POCT Blood Glucose.: 307 mg/dL (29 Jul 2021 17:04)      07-30    139  |  101  |  44<H>  ----------------------------<  156<H>  4.2   |  23  |  1.20    EGFR if : 51<L>  EGFR if non : 44<L>    Ca    9.4      07-30  Mg     2.10     07-28  Phos  3.4     07-28    TPro  7.1  /  Alb  3.6  /  TBili  <0.2  /  DBili  x   /  AST  18  /  ALT  17  /  AlkPhos  107  07-30      Thyroid Function Tests:  07-28 @ 07:41 TSH 2.11 FreeT4 1.1 T3 -- Anti TPO -- Anti Thyroglobulin Ab -- TSI --  07-27 @ 07:53 TSH 1.83 FreeT4 -- T3 -- Anti TPO -- Anti Thyroglobulin Ab -- TSI --      A1C with Estimated Average Glucose Result: 9.2 % (07-27-21 @ 07:53)    Diet, Consistent Carbohydrate/No Snacks (07-28-21 @ 13:49)

## 2021-07-30 NOTE — PHYSICAL THERAPY INITIAL EVALUATION ADULT - PERTINENT HX OF CURRENT PROBLEM, REHAB EVAL
77 yo f IDDM, HTN, Hypothyroid, leg swelling on lasix (also on toprol xl but no clear h/o HF) h/o stroke. Pt vaccinated with last dose of Moderna end of April. Presenting with several days cough, sore throat, generalized weakness, fever. COVID +.  T max here 100.3 (O).

## 2021-07-30 NOTE — PROGRESS NOTE ADULT - PROBLEM SELECTOR PLAN 5
Switched to Heparin SQ given renal function.  Dispo: pending hospital course. CHECK AMBULATORY SATS.   PT recommended no PT needs.   DC likely tomorrow, after completion of Remdesivir. Reports taking 30 Novolog bid before lunch and dinner as well as basaglar 30 qhs. Despite not getting insulin in ed, and receiving Decadron, FS modest 115. will give 15 Lantus x1, and mod ISS , assess response.  - Glucose levels 500s, BHB mildly elevated, Gap closed. Endo consulted, appreciate recommendations.   - ENDO consulted, follow up recommendations.   - Lantus 40 Units AM, Admelog 22 Units TID, Mod ISS, POC monitoring.  - NOW OFF STEROIDS, anticipate reduced insulin requirements, f/u Endo recs for DC planning.

## 2021-07-30 NOTE — DISCHARGE NOTE NURSING/CASE MANAGEMENT/SOCIAL WORK - NSDCFUADDAPPT_GEN_ALL_CORE_FT
If you are in need of a general medicine physician and post-discharge medical follow-up for further care/recommendations you may contact the [facility] for an appointment ([phone]).       You were referred to [facility] program. You will receive a phone call from program for   additional follow-up for COVID

## 2021-07-31 VITALS
HEART RATE: 60 BPM | TEMPERATURE: 98 F | DIASTOLIC BLOOD PRESSURE: 74 MMHG | SYSTOLIC BLOOD PRESSURE: 147 MMHG | OXYGEN SATURATION: 96 % | RESPIRATION RATE: 19 BRPM

## 2021-07-31 LAB
ALBUMIN SERPL ELPH-MCNC: 3.4 G/DL — SIGNIFICANT CHANGE UP (ref 3.3–5)
ALP SERPL-CCNC: 102 U/L — SIGNIFICANT CHANGE UP (ref 40–120)
ALT FLD-CCNC: 27 U/L — SIGNIFICANT CHANGE UP (ref 4–33)
ANION GAP SERPL CALC-SCNC: 17 MMOL/L — HIGH (ref 7–14)
AST SERPL-CCNC: 22 U/L — SIGNIFICANT CHANGE UP (ref 4–32)
BILIRUB SERPL-MCNC: <0.2 MG/DL — SIGNIFICANT CHANGE UP (ref 0.2–1.2)
BUN SERPL-MCNC: 44 MG/DL — HIGH (ref 7–23)
CALCIUM SERPL-MCNC: 9.4 MG/DL — SIGNIFICANT CHANGE UP (ref 8.4–10.5)
CHLORIDE SERPL-SCNC: 100 MMOL/L — SIGNIFICANT CHANGE UP (ref 98–107)
CO2 SERPL-SCNC: 19 MMOL/L — LOW (ref 22–31)
CREAT SERPL-MCNC: 1.13 MG/DL — SIGNIFICANT CHANGE UP (ref 0.5–1.3)
GLUCOSE BLDC GLUCOMTR-MCNC: 130 MG/DL — HIGH (ref 70–99)
GLUCOSE BLDC GLUCOMTR-MCNC: 151 MG/DL — HIGH (ref 70–99)
GLUCOSE SERPL-MCNC: 133 MG/DL — HIGH (ref 70–99)
HCT VFR BLD CALC: 41.5 % — SIGNIFICANT CHANGE UP (ref 34.5–45)
HGB BLD-MCNC: 12.9 G/DL — SIGNIFICANT CHANGE UP (ref 11.5–15.5)
MCHC RBC-ENTMCNC: 27.2 PG — SIGNIFICANT CHANGE UP (ref 27–34)
MCHC RBC-ENTMCNC: 31.1 GM/DL — LOW (ref 32–36)
MCV RBC AUTO: 87.6 FL — SIGNIFICANT CHANGE UP (ref 80–100)
NRBC # BLD: 0 /100 WBCS — SIGNIFICANT CHANGE UP
NRBC # FLD: 0 K/UL — SIGNIFICANT CHANGE UP
PLATELET # BLD AUTO: 351 K/UL — SIGNIFICANT CHANGE UP (ref 150–400)
POTASSIUM SERPL-MCNC: 4.4 MMOL/L — SIGNIFICANT CHANGE UP (ref 3.5–5.3)
POTASSIUM SERPL-SCNC: 4.4 MMOL/L — SIGNIFICANT CHANGE UP (ref 3.5–5.3)
PROT SERPL-MCNC: 7.1 G/DL — SIGNIFICANT CHANGE UP (ref 6–8.3)
RBC # BLD: 4.74 M/UL — SIGNIFICANT CHANGE UP (ref 3.8–5.2)
RBC # FLD: 14.6 % — HIGH (ref 10.3–14.5)
SODIUM SERPL-SCNC: 136 MMOL/L — SIGNIFICANT CHANGE UP (ref 135–145)
WBC # BLD: 13.89 K/UL — HIGH (ref 3.8–10.5)
WBC # FLD AUTO: 13.89 K/UL — HIGH (ref 3.8–10.5)

## 2021-07-31 PROCEDURE — 99239 HOSP IP/OBS DSCHRG MGMT >30: CPT

## 2021-07-31 RX ORDER — INSULIN ASPART 100 [IU]/ML
30 INJECTION, SOLUTION SUBCUTANEOUS
Qty: 0 | Refills: 0 | DISCHARGE

## 2021-07-31 RX ORDER — INSULIN GLARGINE 100 [IU]/ML
30 INJECTION, SOLUTION SUBCUTANEOUS
Qty: 0 | Refills: 0 | DISCHARGE

## 2021-07-31 RX ORDER — INSULIN GLARGINE 100 [IU]/ML
40 INJECTION, SOLUTION SUBCUTANEOUS EVERY MORNING
Refills: 0 | Status: DISCONTINUED | OUTPATIENT
Start: 2021-08-01 | End: 2021-07-31

## 2021-07-31 RX ORDER — CETIRIZINE HYDROCHLORIDE 10 MG/1
1 TABLET ORAL
Qty: 0 | Refills: 0 | DISCHARGE

## 2021-07-31 RX ORDER — FEXOFENADINE HCL 30 MG
1 TABLET ORAL
Qty: 0 | Refills: 0 | DISCHARGE

## 2021-07-31 RX ORDER — LOSARTAN POTASSIUM 100 MG/1
1 TABLET, FILM COATED ORAL
Qty: 0 | Refills: 0 | DISCHARGE

## 2021-07-31 RX ORDER — DAPAGLIFLOZIN 10 MG/1
1 TABLET, FILM COATED ORAL
Qty: 0 | Refills: 0 | DISCHARGE

## 2021-07-31 RX ORDER — INSULIN GLARGINE 100 [IU]/ML
40 INJECTION, SOLUTION SUBCUTANEOUS
Qty: 0 | Refills: 0 | DISCHARGE

## 2021-07-31 RX ORDER — INSULIN LISPRO 100/ML
30 VIAL (ML) SUBCUTANEOUS
Refills: 0 | Status: DISCONTINUED | OUTPATIENT
Start: 2021-07-31 | End: 2021-07-31

## 2021-07-31 RX ADMIN — Medication 50 MICROGRAM(S): at 05:42

## 2021-07-31 RX ADMIN — REMDESIVIR 500 MILLIGRAM(S): 5 INJECTION INTRAVENOUS at 07:29

## 2021-07-31 RX ADMIN — Medication 2: at 12:41

## 2021-07-31 RX ADMIN — Medication 35 UNIT(S): at 08:30

## 2021-07-31 RX ADMIN — Medication 100 MILLIGRAM(S): at 05:42

## 2021-07-31 RX ADMIN — HEPARIN SODIUM 5000 UNIT(S): 5000 INJECTION INTRAVENOUS; SUBCUTANEOUS at 12:41

## 2021-07-31 RX ADMIN — HEPARIN SODIUM 5000 UNIT(S): 5000 INJECTION INTRAVENOUS; SUBCUTANEOUS at 05:42

## 2021-07-31 RX ADMIN — PANTOPRAZOLE SODIUM 40 MILLIGRAM(S): 20 TABLET, DELAYED RELEASE ORAL at 05:42

## 2021-07-31 RX ADMIN — INSULIN GLARGINE 50 UNIT(S): 100 INJECTION, SOLUTION SUBCUTANEOUS at 08:30

## 2021-07-31 RX ADMIN — Medication 40 MILLIGRAM(S): at 05:42

## 2021-07-31 RX ADMIN — Medication 35 UNIT(S): at 12:42

## 2021-07-31 NOTE — PROGRESS NOTE ADULT - PROBLEM SELECTOR PROBLEM 2
Hypothyroidism, unspecified type
Hypothyroidism, unspecified type
Steroid-induced hyperglycemia
Hypothyroidism, unspecified type

## 2021-07-31 NOTE — PROGRESS NOTE ADULT - PROBLEM SELECTOR PROBLEM 1
Type 2 diabetes mellitus with hyperglycemia, with long-term current use of insulin
COVID-19
Type 2 diabetes mellitus with hyperglycemia, with long-term current use of insulin
COVID-19

## 2021-07-31 NOTE — PROGRESS NOTE ADULT - SUBJECTIVE AND OBJECTIVE BOX
PROGRESS NOTE:     Patient is a 76y old  Female who presents with a chief complaint of covid (30 Jul 2021 15:11)    SUBJECTIVE / OVERNIGHT EVENTS: Patient seen and evaluated at bedside. NO acute distress evident, no overnight events. Reports feeling well , no sob, chest pain, abdominal pain, nausea, vomiting,     ADDITIONAL REVIEW OF SYSTEMS:    MEDICATIONS  (STANDING):  dextrose 40% Gel 15 Gram(s) Oral once  dextrose 5%. 1000 milliLiter(s) (50 mL/Hr) IV Continuous <Continuous>  dextrose 5%. 1000 milliLiter(s) (100 mL/Hr) IV Continuous <Continuous>  dextrose 5%. 1000 milliLiter(s) (100 mL/Hr) IV Continuous <Continuous>  dextrose 5%. 1000 milliLiter(s) (100 mL/Hr) IV Continuous <Continuous>  dextrose 50% Injectable 25 Gram(s) IV Push once  dextrose 50% Injectable 12.5 Gram(s) IV Push once  dextrose 50% Injectable 25 Gram(s) IV Push once  furosemide    Tablet 40 milliGRAM(s) Oral daily  glucagon  Injectable 1 milliGRAM(s) IntraMuscular once  glucagon  Injectable 1 milliGRAM(s) IntraMuscular once  glucagon  Injectable 1 milliGRAM(s) IntraMuscular once  heparin   Injectable 5000 Unit(s) SubCutaneous every 8 hours  insulin glargine Injectable (LANTUS) 50 Unit(s) SubCutaneous every morning  insulin lispro (ADMELOG) corrective regimen sliding scale   SubCutaneous three times a day before meals  insulin lispro (ADMELOG) corrective regimen sliding scale   SubCutaneous at bedtime  insulin lispro Injectable (ADMELOG) 35 Unit(s) SubCutaneous three times a day before meals  levothyroxine 50 MICROGram(s) Oral daily  metoprolol succinate  milliGRAM(s) Oral daily  pantoprazole    Tablet 40 milliGRAM(s) Oral before breakfast  polyethylene glycol 3350 17 Gram(s) Oral daily    MEDICATIONS  (PRN):  acetaminophen   Tablet .. 650 milliGRAM(s) Oral every 6 hours PRN Temp greater or equal to 38.5C (101.3F), Mild Pain (1 - 3)  aluminum hydroxide/magnesium hydroxide/simethicone Suspension 30 milliLiter(s) Oral every 4 hours PRN Dyspepsia  benzonatate 200 milliGRAM(s) Oral every 8 hours PRN Cough  bisacodyl Suppository 10 milliGRAM(s) Rectal daily PRN Constipation  melatonin 3 milliGRAM(s) Oral at bedtime PRN Insomnia  ondansetron Injectable 4 milliGRAM(s) IV Push every 8 hours PRN Nausea and/or Vomiting    CAPILLARY BLOOD GLUCOSE    POCT Blood Glucose.: 151 mg/dL (31 Jul 2021 12:36)  POCT Blood Glucose.: 130 mg/dL (31 Jul 2021 07:41)  POCT Blood Glucose.: 241 mg/dL (30 Jul 2021 21:16)  POCT Blood Glucose.: 250 mg/dL (30 Jul 2021 16:58)    I&O's Summary    PHYSICAL EXAM:  Vital Signs Last 24 Hrs  T(C): 36.7 (31 Jul 2021 12:46), Max: 36.8 (30 Jul 2021 21:01)  T(F): 98 (31 Jul 2021 12:46), Max: 98.3 (30 Jul 2021 21:01)  HR: 60 (31 Jul 2021 12:46) (58 - 61)  BP: 147/74 (31 Jul 2021 12:46) (123/51 - 147/74)  BP(mean): --  RR: 19 (31 Jul 2021 12:46) (16 - 19)  SpO2: 96% (31 Jul 2021 12:46) (95% - 96%)    CONSTITUTIONAL: NAD, well-developed, comfortable appearing.   RESPIRATORY: Normal respiratory effort; lungs are clear to auscultation bilaterally  CARDIOVASCULAR: Regular rate and rhythm, normal S1 and S2  No lower extremity edema; Peripheral pulses are 2+ bilaterally  ABDOMEN: Nontender to palpation, normoactive bowel sounds  MUSCLOSKELETAL: no clubbing or cyanosis of digits; no joint swelling or tenderness to palpation  PSYCH: A+O to person, place, and time; affect appropriate    LABS: reviewed,                         12.9   13.89 )-----------( 351      ( 31 Jul 2021 07:33 )             41.5     07-31    136  |  100  |  44<H>  ----------------------------<  133<H>  4.4   |  19<L>  |  1.13    Ca    9.4      31 Jul 2021 07:33    TPro  7.1  /  Alb  3.4  /  TBili  <0.2  /  DBili  x   /  AST  22  /  ALT  27  /  AlkPhos  102  07-31    RADIOLOGY & ADDITIONAL TESTS:  Results Reviewed:   Imaging Personally Reviewed:  Electrocardiogram Personally Reviewed:    COORDINATION OF CARE:  Care Discussed with Consultants/Other Providers [Y/N]:  Prior or Outpatient Records Reviewed [Y/N]:

## 2021-07-31 NOTE — PROGRESS NOTE ADULT - ASSESSMENT
A 76 year old female with DM on Insulin, Hypothyroidism, COVID. 
76 yr old F with primary hypothyroidism and Type 2 DM uncontrolled A1C 9.2% here with COVID. Endocrine consulted for steroid exacerbated hyperglycemia.    1. DM 2 with hyperglycemia   A1c 9.2%, on basal/bolus insulin at home (Novolog and Basaglar)  Goal A1c 7-7.5% given age  Here with steroid induced hyperglycemia, COVID+    While inpatient:   Goal Glucose 100-180  Above goal  Continue Lantus 50 units SQ qAM  Increased Admelog to 35 units TID before meals (Hold if not eating) - first dose received at lunch today  Admelog MODERATE dose correctional scale before meals, moderate dose at bedtime  Consistent carbohydrate diet  Check BG before meals and bedtime    Discharge Plan:  Resume basal/bolus insulin PENS with dose TBD  Patient also listed as taking Farxiga at home - check GFR on day of dc to confirm whether this can be resumed   Discussed modifications to home regimen (dividing Novolog into 3 doses, before breakfast, lunch and dinner) and balancing diet with carbohydrates, protein and fiber sources   Please send glucose test strips, lancets, alcohol swabs, insulin PEN needles and refills of Basaglar/Novolog to preferred pharmacy ([facility])  Followup with PCP, should also have regular visits with opthalmology and podiatry     2. Steroid exacerbated hyperglycemia  Currently on Dexamethasone 6 mg daily, originally ordered for 9 days, however per primary team last dose planned for tomorrow 7/31  Contact endocrine if plan changes     3. Hypothyroidism   Continue Levothyroxine 50 mcg PO daily, take in AM, at least 30 minutes before food or other medications   Repeat TFTs in 6 weeks as outpatient    Karo Floyd  Nurse Practitioner  Division of Endocrinology & Diabetes  In house pager #[phone]/long range pager #[phone]    If before 9AM or after 6PM, or on weekends/holidays, please call endocrine answering service for assistance ([phone]).  For nonurgent matters email [email or web address] for assistance.   Greater than 35 minutes spent in assessment, coordination of care and education for uncontrolled diabetes 
A 76 year old female with DM on Insulin, Hypothyroidism, COVID. 
76 yr old F with primary hypothyroidism and Type 2 DM uncontrolled A1C 9.2% here with COVID. Endocrine consulted for steroid exacerbated hyperglycemia.
A 76 year old female with DM on Insulin, Hypothyroidism, COVID. 
A 76 year old female with DM on Insulin, Hypothyroidism, COVID 
A 76 year old female with DM on Insulin, Hypothyroidism, COVID

## 2021-07-31 NOTE — PROGRESS NOTE ADULT - PROBLEM SELECTOR PLAN 5
Reports taking 30 Novolog bid before lunch and dinner as well as basaglar 30 qhs. Despite not getting insulin in ed, and receiving Decadron, FS modest 115. will give 15 Lantus x1, and mod ISS , assess response.  - Glucose levels 500s, BHB mildly elevated, Gap closed. Endo consulted, appreciate recommendations.   - ENDO consulted, follow up recommendations.   - Lantus 40 Units AM, Admelog 22 Units TID, Mod ISS, POC monitoring.  - NOW OFF STEROIDS, anticipate reduced insulin requirements,   - f/u Endo recs for DC planning.

## 2021-07-31 NOTE — PROGRESS NOTE ADULT - PROBLEM SELECTOR PROBLEM 3
Hypothyroidism, unspecified type
WILBER (acute kidney injury)

## 2021-07-31 NOTE — CHART NOTE - NSCHARTNOTEFT_GEN_A_CORE
76 yr old F with primary hypothyroidism and Type 2 DM uncontrolled A1C 9.2% here with COVID. Endocrine consulted for steroid exacerbated hyperglycemia in 600s.   BG levels have been ranging from 254-511. Will continue Lantus to 50 units q AM and will increase Admelog to 28 Units TIDAC plus moderate scale  Patient will be discharged on basal/bolus  Endocrine team will follow.      Beth Lagos DO
Notified by RN that patient was sustaining at 88% on RA while sleeping. Placed on 2L NC and SpO2 97%. Attempt to wean in AM.
Patient noted to have persistently elevated FS. Recommend NPH 7 Units STAT X 1. Increase Lantus to 50 units q AM and Admelog to 22 Units TIDAC plus moderate scale. Discussed with team.     Libertad Harp DO
Spoke with grand daughter at great lengths reviewed medications and instructions and she displayed understanding of instructions.   She had enough pens and refills and did not require prescription.
76 yr old F with primary hypothyroidism and Type 2 DM uncontrolled A1C 9.2% here with COVID. Endocrine consulted for steroid exacerbated hyperglycemia.    Called by primary team for dc recs- patient last dexameth dose was yesterday    CAPILLARY BLOOD GLUCOSE  POCT Blood Glucose.: 151 mg/dL (31 Jul 2021 12:36)  POCT Blood Glucose.: 130 mg/dL (31 Jul 2021 07:41)  POCT Blood Glucose.: 241 mg/dL (30 Jul 2021 21:16)  POCT Blood Glucose.: 250 mg/dL (30 Jul 2021 16:58)    Comprehensive Metabolic Panel in AM (07.31.21 @ 07:33)    eGFR if Non : 47    eGFR if : 55 mL/min/1.73M2        1. DM 2 with hyperglycemia   A1c 9.2%, on basal/bolus insulin at home (Novolog and Basaglar)  Goal A1c 7-7.5% given age  Here with steroid induced hyperglycemia, COVID+    While inpatient:   Goal Glucose 100-180 mg/dl  Last decadron dose was yesterday, therefore expect insulin requirements to decrease further over next 24 hours  Reduce Lantus to 40 units SQ qAM  Reduced Admelog to 30 units TID before meals (Hold if not eating)  Can continue Admelog MODERATE dose correctional scale before meals, moderate dose at bedtime.  Consistent carbohydrate diet  Check BG before meals and bedtime    Discharge Plan:  Resume basal/bolus insulin PENS - Would discharge on Basaglar 40 units Q AM (please assure patient is taking basaglar every AM daily around same time each day)  If patient prefers to take QHS dosing, then would hold basaglar tonight and resume tomorrow night.  Patient should expect blood glucose to be elevated if planning to transition to night dosing  Also Novolog pen- 30 units TID AC- HOLD if skips meal  Farxiga should not be used with GFR < 45.  Patient GFR is 47 which is very close to limitation if use.  Therefore, for now, would hold Farxiga.  patient should follow up with primary or an endocrinologist, repeat BMP with GFR and determine if patient can be restarted on Farxiga.    Endo team previously discussed modifications to home regimen (dividing Novolog into 3 doses, before breakfast, lunch and dinner) and balancing diet with carbohydrates, protein and fiber sources   Please send glucose test strips, lancets, alcohol swabs, insulin PEN needles and refills of Basaglar/Novolog to preferred pharmacy ([facility])  Followup with PCP, should also have regular visits with opthalmology and podiatry - can follow up with [facility] endocrinology if patient chooses to. [phone]

## 2021-07-31 NOTE — PROGRESS NOTE ADULT - PROBLEM SELECTOR PLAN 3
Unclear baseline.  Hold ARB. Avoid nephrotoxic agents.   Monitor Urine OP, electrolytes, Cr.
Unclear baseline.  Will follow post IVF renal function  Hold arb.
Unclear baseline.  Will follow post IVF renal function.  Hold ARB. Avoid nephrotoxic agents.   Monitor Urine OP, electrolytes, Cr.
Unclear baseline.  Will follow post IVF renal function.  Hold ARB. Avoid nephrotoxic agents.   Monitor Urine OP, electrolytes, Cr.
Unclear baseline.  Hold ARB. Avoid nephrotoxic agents.   Monitor Urine OP, electrolytes, Cr.

## 2021-07-31 NOTE — PROGRESS NOTE ADULT - PROBLEM SELECTOR PLAN 2
- Pt reports self DCd Synthroid last month.  - Restart her 50mcg dose, TSH/Free T4 ordered with am labs.
continue LT4 50 mcg daily  Repeat TFTs in 6 weeks as outpt      Libertad Harp (pager [phone])  On evenings and weekends, please call [phone] or page endocrine fellow on call.   Please note that this patient may be followed by different provider tomorrow. If no answer, contact endocrine fellow on call.
- Pt reports self DCd Synthroid last month.  - Restart her 50mcg dose, TSH/Free T4 ordered with am labs.
- Pt reports self DCd synthroid last month.  - Restart her 50mcg dose, tsh ordered with am labs
- Pt reports self DCd Synthroid last month.  - Restart her 50mcg dose, TSH/Free T4 ordered with am labs.
- Pt reports self DCd synthroid last month.  - Restart her 50mcg dose, TSH/Free T4 ordered with am labs.

## 2021-07-31 NOTE — PROGRESS NOTE ADULT - PROBLEM SELECTOR PLAN 1
CHO diet   FS AC and HS  Goal Glucose 100-180  Recommend increase in Lantus to 40 Units qAM  Keep on Admelog 15 Units TIDAC  CHO diet and moderate correctional scale before meals and bedtime  Patient will be discharged on basal/bolus  Endocrine team will follow  She can f/u at [address] [place] [address] [place] [phone]
- Chest X-ray clear lungs. Vaccination status:   - Continue Decadron, Remdesivir x5 days.   - Currently saturating well on room air, supplemental O2 PRN.   - Supportive measures: Tylenol PRN, Robitussin PRN, IC.   - DVT ppx: per Covid protocol.
- Chest X-ray clear lungs. Vaccination status: no.   - Continue Decadron (7/26-7/30), Remdesivir x5 days (7/27-7/31).   - Currently saturating well on room air, supplemental O2 PRN.   - Supportive measures: Tylenol PRN, Robitussin PRN, IC.   - DVT ppx: per Covid protocol.  - CHECK AMBULATORY SATS IN ANTICIPATION FOR DC LAUREEN.
- Decadron, Remdesivir, Lovenox, supplemental O2 prn  - Chest X-ray clear lungs.   - DVT ppx: per Covid protocol.
- Chest X-ray clear lungs. Vaccination status: no.   - Continue Decadron (7/26-7/30), Remdesivir x5 days (7/27-7/31).   - Currently saturating well on room air, supplemental O2 PRN.   - Supportive measures: Tylenol PRN, Robitussin PRN, IC.   - DVT ppx: per Covid protocol.  - STABLE AMBULATORY SATS >96% IN ANTICIPATION FOR DC.
- Chest X-ray clear lungs. Vaccination status:   - Continue Decadron, Remdesivir x5 days.   - Currently saturating well on room air, supplemental O2 PRN.   - Supportive measures: Tylenol PRN, Robitussin PRN, IC.   - DVT ppx: per Covid protocol.

## 2021-07-31 NOTE — PROGRESS NOTE ADULT - PROBLEM SELECTOR PLAN 6
Switched to Heparin SQ given renal function.  Dispo: Stable for DC today, updated granddaughter on DC planning.   PT recommended no PT needs.   s/p Remdesivir, [place]. Tolerating PO, ambulating.   To follow up with PCP And CROWNs on DC.   DC planning time: 34 min in coordinating DC plan with patient and team.
Switched to Heparin SQ given renal function.  Dispo: pending hospital course. CHECK AMBULATORY SATS.   PT recommended no PT needs.   DC likely tomorrow, after completion of Remdesivir.

## 2021-08-12 PROBLEM — E78.5 HYPERLIPIDEMIA, UNSPECIFIED: Chronic | Status: ACTIVE | Noted: 2021-07-27

## 2021-08-12 PROBLEM — E03.9 HYPOTHYROIDISM, UNSPECIFIED: Chronic | Status: ACTIVE | Noted: 2021-07-27

## 2021-08-12 PROBLEM — Z86.79 PERSONAL HISTORY OF OTHER DISEASES OF THE CIRCULATORY SYSTEM: Chronic | Status: ACTIVE | Noted: 2021-07-27

## 2021-08-12 PROBLEM — I63.9 CEREBRAL INFARCTION, UNSPECIFIED: Chronic | Status: ACTIVE | Noted: 2021-07-27

## 2021-08-12 PROBLEM — E11.9 TYPE 2 DIABETES MELLITUS WITHOUT COMPLICATIONS: Chronic | Status: ACTIVE | Noted: 2021-07-27

## 2021-08-12 PROBLEM — J45.909 UNSPECIFIED ASTHMA, UNCOMPLICATED: Chronic | Status: ACTIVE | Noted: 2021-07-26

## 2022-02-03 ENCOUNTER — APPOINTMENT (OUTPATIENT)
Dept: ENDOCRINOLOGY | Facility: CLINIC | Age: 77
End: 2022-02-03

## 2023-03-17 NOTE — ED ADULT NURSE NOTE - NSFALLRSKASSISTTYPE_ED_ALL_ED
Patient educated on at home monitoring, health maintenance, including pain-control pharmacologic and non-pharmacologic measures. Patient also educated on follow-up information and instructed to return to ED if symptoms worsen or persist. Patient gives verbal understanding of all education provided. Patient is agreeable to this plan and states readiness to go home. Discharge materials explained in details; pt verbalized understanding of discharge teaching.     Pt is alert and oriented x 4, ambulating without difficulty. The provider assessed the patient's capacity to be discharged by evaluating the patients cognitive, motor and affective functions. Vital signs stable prior to discharge (see flowsheet). Pt discharged by ED provider and RN.   3   Standing/Walking/Toileting

## 2025-02-11 NOTE — ED ADULT TRIAGE NOTE - RESPIRATORY RATE (BREATHS/MIN)
Writer received critical INR of 6.3 from [facility] from [facility]. Writer relayed lab result to Dr. Wiggins, covering for Dr. Flor. Writer was instructed to order 5mg of vitamin K, to take daily for two days and then repeat INR 2/13. Patient was called and gave permission to relay information to wife, Patricia. Jojo was relayed information/plan above. Patient's wife understood plan. Patient/wife was advised ER for any bleeding, nose bleeds, rectum, cuts.    20

## 2025-03-20 ENCOUNTER — APPOINTMENT (OUTPATIENT)
Dept: NUCLEAR MEDICINE | Facility: IMAGING CENTER | Age: 80
End: 2025-03-20
Payer: COMMERCIAL

## 2025-03-20 ENCOUNTER — RESULT REVIEW (OUTPATIENT)
Age: 80
End: 2025-03-20

## 2025-03-20 ENCOUNTER — OUTPATIENT (OUTPATIENT)
Dept: OUTPATIENT SERVICES | Facility: HOSPITAL | Age: 80
LOS: 1 days | End: 2025-03-20
Payer: COMMERCIAL

## 2025-03-20 DIAGNOSIS — D3A.090 BENIGN CARCINOID TUMOR OF THE BRONCHUS AND LUNG: ICD-10-CM

## 2025-03-20 PROCEDURE — 78815 PET IMAGE W/CT SKULL-THIGH: CPT | Mod: 26,PI

## 2025-03-20 PROCEDURE — A9592: CPT

## 2025-03-20 PROCEDURE — 78815 PET IMAGE W/CT SKULL-THIGH: CPT

## 2025-03-20 PROCEDURE — A9587: CPT

## 2025-03-21 ENCOUNTER — INPATIENT (INPATIENT)
Facility: HOSPITAL | Age: 80
LOS: 5 days | Discharge: HOME | End: 2025-03-27
Attending: INTERNAL MEDICINE | Admitting: INTERNAL MEDICINE
Payer: MEDICARE

## 2025-03-21 VITALS
HEART RATE: 82 BPM | TEMPERATURE: 98 F | HEIGHT: 64 IN | DIASTOLIC BLOOD PRESSURE: 47 MMHG | RESPIRATION RATE: 20 BRPM | SYSTOLIC BLOOD PRESSURE: 111 MMHG | OXYGEN SATURATION: 100 % | WEIGHT: 160.06 LBS

## 2025-03-21 PROCEDURE — 99285 EMERGENCY DEPT VISIT HI MDM: CPT

## 2025-03-21 PROCEDURE — 93010 ELECTROCARDIOGRAM REPORT: CPT

## 2025-03-21 RX ORDER — IPRATROPIUM BROMIDE AND ALBUTEROL SULFATE .5; 2.5 MG/3ML; MG/3ML
3 SOLUTION RESPIRATORY (INHALATION)
Refills: 0 | Status: COMPLETED | OUTPATIENT
Start: 2025-03-21 | End: 2025-03-22

## 2025-03-21 NOTE — ED ADULT TRIAGE NOTE - CHIEF COMPLAINT QUOTE
hx of COPD and mass on lung PT PW chronic cough EMS noted wheezing and tachypnea given 3 combi , 2mg magnesium and 125 solu-med via 20 G L hand

## 2025-03-22 DIAGNOSIS — B33.8 OTHER SPECIFIED VIRAL DISEASES: ICD-10-CM

## 2025-03-22 DIAGNOSIS — J45.901 UNSPECIFIED ASTHMA WITH (ACUTE) EXACERBATION: ICD-10-CM

## 2025-03-22 LAB
ALBUMIN SERPL ELPH-MCNC: 3.2 G/DL — LOW (ref 3.3–5)
ALP SERPL-CCNC: 133 U/L — HIGH (ref 40–120)
ALT FLD-CCNC: 24 U/L — SIGNIFICANT CHANGE UP (ref 12–78)
ANION GAP SERPL CALC-SCNC: 5 MMOL/L — SIGNIFICANT CHANGE UP (ref 5–17)
AST SERPL-CCNC: 22 U/L — SIGNIFICANT CHANGE UP (ref 15–37)
BASOPHILS # BLD AUTO: 0.06 K/UL — SIGNIFICANT CHANGE UP (ref 0–0.2)
BASOPHILS NFR BLD AUTO: 0.6 % — SIGNIFICANT CHANGE UP (ref 0–2)
BILIRUB SERPL-MCNC: 0.5 MG/DL — SIGNIFICANT CHANGE UP (ref 0.2–1.2)
BUN SERPL-MCNC: 38 MG/DL — HIGH (ref 7–23)
CALCIUM SERPL-MCNC: 9.1 MG/DL — SIGNIFICANT CHANGE UP (ref 8.5–10.1)
CHLORIDE SERPL-SCNC: 103 MMOL/L — SIGNIFICANT CHANGE UP (ref 96–108)
CO2 SERPL-SCNC: 26 MMOL/L — SIGNIFICANT CHANGE UP (ref 22–31)
CREAT SERPL-MCNC: 1.84 MG/DL — HIGH (ref 0.5–1.3)
EGFR: 28 ML/MIN/1.73M2 — LOW
EGFR: 28 ML/MIN/1.73M2 — LOW
EOSINOPHIL # BLD AUTO: 0.17 K/UL — SIGNIFICANT CHANGE UP (ref 0–0.5)
EOSINOPHIL NFR BLD AUTO: 1.7 % — SIGNIFICANT CHANGE UP (ref 0–6)
FLUAV AG NPH QL: SIGNIFICANT CHANGE UP
FLUBV AG NPH QL: SIGNIFICANT CHANGE UP
GLUCOSE BLDC GLUCOMTR-MCNC: 372 MG/DL — HIGH (ref 70–99)
GLUCOSE BLDC GLUCOMTR-MCNC: 415 MG/DL — HIGH (ref 70–99)
GLUCOSE BLDC GLUCOMTR-MCNC: 505 MG/DL — CRITICAL HIGH (ref 70–99)
GLUCOSE BLDC GLUCOMTR-MCNC: 511 MG/DL — CRITICAL HIGH (ref 70–99)
GLUCOSE BLDC GLUCOMTR-MCNC: 544 MG/DL — CRITICAL HIGH (ref 70–99)
GLUCOSE BLDC GLUCOMTR-MCNC: 561 MG/DL — CRITICAL HIGH (ref 70–99)
GLUCOSE SERPL-MCNC: 213 MG/DL — HIGH (ref 70–99)
HCT VFR BLD CALC: 37.8 % — SIGNIFICANT CHANGE UP (ref 34.5–45)
HGB BLD-MCNC: 12 G/DL — SIGNIFICANT CHANGE UP (ref 11.5–15.5)
IMM GRANULOCYTES NFR BLD AUTO: 0.3 % — SIGNIFICANT CHANGE UP (ref 0–0.9)
LYMPHOCYTES # BLD AUTO: 1.72 K/UL — SIGNIFICANT CHANGE UP (ref 1–3.3)
LYMPHOCYTES # BLD AUTO: 17.3 % — SIGNIFICANT CHANGE UP (ref 13–44)
MAGNESIUM SERPL-MCNC: 2.1 MG/DL — SIGNIFICANT CHANGE UP (ref 1.6–2.6)
MCHC RBC-ENTMCNC: 27 PG — SIGNIFICANT CHANGE UP (ref 27–34)
MCHC RBC-ENTMCNC: 31.7 G/DL — LOW (ref 32–36)
MCV RBC AUTO: 85.1 FL — SIGNIFICANT CHANGE UP (ref 80–100)
MONOCYTES # BLD AUTO: 0.38 K/UL — SIGNIFICANT CHANGE UP (ref 0–0.9)
MONOCYTES NFR BLD AUTO: 3.8 % — SIGNIFICANT CHANGE UP (ref 2–14)
NEUTROPHILS # BLD AUTO: 7.61 K/UL — HIGH (ref 1.8–7.4)
NEUTROPHILS NFR BLD AUTO: 76.3 % — SIGNIFICANT CHANGE UP (ref 43–77)
NRBC BLD AUTO-RTO: 0 /100 WBCS — SIGNIFICANT CHANGE UP (ref 0–0)
NT-PROBNP SERPL-SCNC: 231 PG/ML — SIGNIFICANT CHANGE UP (ref 0–450)
PLATELET # BLD AUTO: 250 K/UL — SIGNIFICANT CHANGE UP (ref 150–400)
POTASSIUM SERPL-MCNC: 4.7 MMOL/L — SIGNIFICANT CHANGE UP (ref 3.5–5.3)
POTASSIUM SERPL-SCNC: 4.7 MMOL/L — SIGNIFICANT CHANGE UP (ref 3.5–5.3)
PROT SERPL-MCNC: 7 GM/DL — SIGNIFICANT CHANGE UP (ref 6–8.3)
RBC # BLD: 4.44 M/UL — SIGNIFICANT CHANGE UP (ref 3.8–5.2)
RBC # FLD: 14.7 % — HIGH (ref 10.3–14.5)
RSV RNA NPH QL NAA+NON-PROBE: DETECTED
SARS-COV-2 RNA SPEC QL NAA+PROBE: SIGNIFICANT CHANGE UP
SODIUM SERPL-SCNC: 134 MMOL/L — LOW (ref 135–145)
SOURCE RESPIRATORY: SIGNIFICANT CHANGE UP
TROPONIN I, HIGH SENSITIVITY RESULT: 18.9 NG/L — SIGNIFICANT CHANGE UP
WBC # BLD: 9.97 K/UL — SIGNIFICANT CHANGE UP (ref 3.8–10.5)
WBC # FLD AUTO: 9.97 K/UL — SIGNIFICANT CHANGE UP (ref 3.8–10.5)

## 2025-03-22 PROCEDURE — 99222 1ST HOSP IP/OBS MODERATE 55: CPT

## 2025-03-22 PROCEDURE — 71045 X-RAY EXAM CHEST 1 VIEW: CPT | Mod: 26

## 2025-03-22 RX ORDER — GLUCAGON 3 MG/1
1 POWDER NASAL ONCE
Refills: 0 | Status: DISCONTINUED | OUTPATIENT
Start: 2025-03-22 | End: 2025-03-27

## 2025-03-22 RX ORDER — INSULIN GLARGINE-YFGN 100 [IU]/ML
10 INJECTION, SOLUTION SUBCUTANEOUS AT BEDTIME
Refills: 0 | Status: DISCONTINUED | OUTPATIENT
Start: 2025-03-22 | End: 2025-03-23

## 2025-03-22 RX ORDER — ONDANSETRON HCL/PF 4 MG/2 ML
4 VIAL (ML) INJECTION EVERY 8 HOURS
Refills: 0 | Status: DISCONTINUED | OUTPATIENT
Start: 2025-03-22 | End: 2025-03-27

## 2025-03-22 RX ORDER — DEXAMETHASONE 0.5 MG/1
10 TABLET ORAL ONCE
Refills: 0 | Status: COMPLETED | OUTPATIENT
Start: 2025-03-22 | End: 2025-03-22

## 2025-03-22 RX ORDER — SODIUM CHLORIDE 9 G/1000ML
1000 INJECTION, SOLUTION INTRAVENOUS
Refills: 0 | Status: DISCONTINUED | OUTPATIENT
Start: 2025-03-22 | End: 2025-03-27

## 2025-03-22 RX ORDER — METHYLPREDNISOLONE ACETATE 80 MG/ML
40 INJECTION, SUSPENSION INTRA-ARTICULAR; INTRALESIONAL; INTRAMUSCULAR; SOFT TISSUE EVERY 8 HOURS
Refills: 0 | Status: DISCONTINUED | OUTPATIENT
Start: 2025-03-22 | End: 2025-03-23

## 2025-03-22 RX ORDER — MAGNESIUM SULFATE 500 MG/ML
2 SYRINGE (ML) INJECTION ONCE
Refills: 0 | Status: COMPLETED | OUTPATIENT
Start: 2025-03-22 | End: 2025-03-22

## 2025-03-22 RX ORDER — MAGNESIUM, ALUMINUM HYDROXIDE 200-200 MG
30 TABLET,CHEWABLE ORAL EVERY 4 HOURS
Refills: 0 | Status: DISCONTINUED | OUTPATIENT
Start: 2025-03-22 | End: 2025-03-27

## 2025-03-22 RX ORDER — INSULIN LISPRO 100 U/ML
INJECTION, SOLUTION INTRAVENOUS; SUBCUTANEOUS
Refills: 0 | Status: DISCONTINUED | OUTPATIENT
Start: 2025-03-22 | End: 2025-03-27

## 2025-03-22 RX ORDER — INSULIN LISPRO 100 U/ML
2 INJECTION, SOLUTION INTRAVENOUS; SUBCUTANEOUS
Refills: 0 | Status: DISCONTINUED | OUTPATIENT
Start: 2025-03-22 | End: 2025-03-23

## 2025-03-22 RX ORDER — LEVALBUTEROL HYDROCHLORIDE 1.25 MG/3ML
0.63 SOLUTION RESPIRATORY (INHALATION) ONCE
Refills: 0 | Status: COMPLETED | OUTPATIENT
Start: 2025-03-22 | End: 2025-03-22

## 2025-03-22 RX ORDER — DEXTROSE 50 % IN WATER 50 %
15 SYRINGE (ML) INTRAVENOUS ONCE
Refills: 0 | Status: DISCONTINUED | OUTPATIENT
Start: 2025-03-22 | End: 2025-03-27

## 2025-03-22 RX ORDER — GABAPENTIN 400 MG/1
300 CAPSULE ORAL ONCE
Refills: 0 | Status: COMPLETED | OUTPATIENT
Start: 2025-03-22 | End: 2025-03-22

## 2025-03-22 RX ORDER — ACETAMINOPHEN 500 MG/5ML
1000 LIQUID (ML) ORAL ONCE
Refills: 0 | Status: DISCONTINUED | OUTPATIENT
Start: 2025-03-22 | End: 2025-03-22

## 2025-03-22 RX ORDER — DEXTROSE 50 % IN WATER 50 %
25 SYRINGE (ML) INTRAVENOUS ONCE
Refills: 0 | Status: DISCONTINUED | OUTPATIENT
Start: 2025-03-22 | End: 2025-03-27

## 2025-03-22 RX ORDER — MELATONIN 5 MG
3 TABLET ORAL AT BEDTIME
Refills: 0 | Status: DISCONTINUED | OUTPATIENT
Start: 2025-03-22 | End: 2025-03-27

## 2025-03-22 RX ORDER — INSULIN LISPRO 100 U/ML
INJECTION, SOLUTION INTRAVENOUS; SUBCUTANEOUS AT BEDTIME
Refills: 0 | Status: DISCONTINUED | OUTPATIENT
Start: 2025-03-22 | End: 2025-03-27

## 2025-03-22 RX ORDER — IPRATROPIUM BROMIDE AND ALBUTEROL SULFATE .5; 2.5 MG/3ML; MG/3ML
3 SOLUTION RESPIRATORY (INHALATION) EVERY 6 HOURS
Refills: 0 | Status: DISCONTINUED | OUTPATIENT
Start: 2025-03-22 | End: 2025-03-26

## 2025-03-22 RX ORDER — ACETAMINOPHEN 500 MG/5ML
650 LIQUID (ML) ORAL EVERY 6 HOURS
Refills: 0 | Status: DISCONTINUED | OUTPATIENT
Start: 2025-03-22 | End: 2025-03-27

## 2025-03-22 RX ADMIN — INSULIN LISPRO 12: 100 INJECTION, SOLUTION INTRAVENOUS; SUBCUTANEOUS at 16:50

## 2025-03-22 RX ADMIN — IPRATROPIUM BROMIDE AND ALBUTEROL SULFATE 3 MILLILITER(S): .5; 2.5 SOLUTION RESPIRATORY (INHALATION) at 00:15

## 2025-03-22 RX ADMIN — INSULIN GLARGINE-YFGN 10 UNIT(S): 100 INJECTION, SOLUTION SUBCUTANEOUS at 21:56

## 2025-03-22 RX ADMIN — IPRATROPIUM BROMIDE AND ALBUTEROL SULFATE 3 MILLILITER(S): .5; 2.5 SOLUTION RESPIRATORY (INHALATION) at 14:04

## 2025-03-22 RX ADMIN — IPRATROPIUM BROMIDE AND ALBUTEROL SULFATE 3 MILLILITER(S): .5; 2.5 SOLUTION RESPIRATORY (INHALATION) at 00:30

## 2025-03-22 RX ADMIN — IPRATROPIUM BROMIDE AND ALBUTEROL SULFATE 3 MILLILITER(S): .5; 2.5 SOLUTION RESPIRATORY (INHALATION) at 00:00

## 2025-03-22 RX ADMIN — Medication 650 MILLIGRAM(S): at 17:07

## 2025-03-22 RX ADMIN — INSULIN LISPRO 2 UNIT(S): 100 INJECTION, SOLUTION INTRAVENOUS; SUBCUTANEOUS at 18:35

## 2025-03-22 RX ADMIN — Medication 650 MILLIGRAM(S): at 23:10

## 2025-03-22 RX ADMIN — IPRATROPIUM BROMIDE AND ALBUTEROL SULFATE 3 MILLILITER(S): .5; 2.5 SOLUTION RESPIRATORY (INHALATION) at 23:55

## 2025-03-22 RX ADMIN — INSULIN LISPRO 12: 100 INJECTION, SOLUTION INTRAVENOUS; SUBCUTANEOUS at 11:18

## 2025-03-22 RX ADMIN — LEVALBUTEROL HYDROCHLORIDE 0.63 MILLIGRAM(S): 1.25 SOLUTION RESPIRATORY (INHALATION) at 04:33

## 2025-03-22 RX ADMIN — Medication 650 MILLIGRAM(S): at 15:01

## 2025-03-22 RX ADMIN — INSULIN LISPRO 10: 100 INJECTION, SOLUTION INTRAVENOUS; SUBCUTANEOUS at 07:44

## 2025-03-22 RX ADMIN — Medication 1000 MILLILITER(S): at 03:58

## 2025-03-22 RX ADMIN — IPRATROPIUM BROMIDE AND ALBUTEROL SULFATE 3 MILLILITER(S): .5; 2.5 SOLUTION RESPIRATORY (INHALATION) at 17:03

## 2025-03-22 RX ADMIN — Medication 150 GRAM(S): at 04:27

## 2025-03-22 RX ADMIN — DEXAMETHASONE 102 MILLIGRAM(S): 0.5 TABLET ORAL at 03:59

## 2025-03-22 RX ADMIN — METHYLPREDNISOLONE ACETATE 40 MILLIGRAM(S): 80 INJECTION, SUSPENSION INTRA-ARTICULAR; INTRALESIONAL; INTRAMUSCULAR; SOFT TISSUE at 13:40

## 2025-03-22 RX ADMIN — INSULIN LISPRO 4: 100 INJECTION, SOLUTION INTRAVENOUS; SUBCUTANEOUS at 21:57

## 2025-03-22 RX ADMIN — METHYLPREDNISOLONE ACETATE 40 MILLIGRAM(S): 80 INJECTION, SUSPENSION INTRA-ARTICULAR; INTRALESIONAL; INTRAMUSCULAR; SOFT TISSUE at 06:03

## 2025-03-22 RX ADMIN — METHYLPREDNISOLONE ACETATE 40 MILLIGRAM(S): 80 INJECTION, SUSPENSION INTRA-ARTICULAR; INTRALESIONAL; INTRAMUSCULAR; SOFT TISSUE at 21:56

## 2025-03-22 RX ADMIN — IPRATROPIUM BROMIDE AND ALBUTEROL SULFATE 3 MILLILITER(S): .5; 2.5 SOLUTION RESPIRATORY (INHALATION) at 06:19

## 2025-03-22 NOTE — H&P ADULT - ASSESSMENT
79 F pmh HTN, DM, asthma, lung cancer, presents today for cough and shortness of breath, Admitted for acute asthma exacerbation 02/02 RSV infection     Acute asthma exacerbation to RSV infection   The patient presents with cough and shortness of breath x3 days. RSV positive.   - Solu-Medrol 40 mg q.8 hours   - Thelma     Diabetes mellitus   - ISS   - POCT glucose checks     The patient cannot recall her medication list. Medication list listed in chart is not updated. Please contact Pharmacy in the AM to verify medication list  79 F pmh HTN, DM, asthma, lung cancer, presents today for cough and shortness of breath, Admitted for acute asthma exacerbation 02/02 RSV infection     Acute asthma exacerbation to RSV infection   The patient presents with cough and shortness of breath x3 days. RSV positive.   - Solu-Medrol 40 mg q.8 hours   - DuoNebs     Diabetes mellitus   - ISS   - POCT glucose checks     Code status: DNR/DNI  The patient cannot recall her medication list. Medication list listed in chart is not updated. Please contact Pharmacy in the AM to verify medication list

## 2025-03-22 NOTE — H&P ADULT - IN ACCORDANCE WITH [place] STATE LAW, WE OFFER EVERY PATIENT A HEPATITIS C TEST. WOULD YOU LIKE TO BE TESTED TODAY?
Detail Level: Zone Render Risk Assessment In Note?: no Note Text (......Xxx Chief Complaint.): This diagnosis correlates with the Opt out

## 2025-03-22 NOTE — H&P ADULT - HISTORY OF PRESENT ILLNESS
79 F pmh HTN, DM, asthma, lung cancer, presents today for cough and shortness of breath. The patient states that she has been experiencing a dry cough for about three days now. She also mentions that she has been wheezing. In addition to this, she has been experiencing nausea, vomiting, and diarrhea. The patient denies fever, chills. The patient denies any sick contacts

## 2025-03-22 NOTE — ED PROVIDER NOTE - NSICDXPASTMEDICALHX_GEN_ALL_CORE_FT
PAST MEDICAL HISTORY:  Asthma     CVA (cerebrovascular accident)     Diabetes mellitus     H/O: HTN (hypertension)     HLD (hyperlipidemia)     Hypothyroid

## 2025-03-22 NOTE — ED PROVIDER NOTE - CLINICAL SUMMARY MEDICAL DECISION MAKING FREE TEXT BOX
79 F pmh HTN, DM, asthma, lung mass presenting to the ED for worsening cough and shortness of breathx 2 days 79 F pmh HTN, DM, asthma, lung mass presenting to the ED for worsening cough and shortness of breath x 2 days    concern for asthma exacerbation, r/o PNA  labs, viral swab  CXR  duonebs, magnesium, steroids  minimal improvement after initial tratment. will admit to medicine for asthma exacerbation in setting of RSV infection  no pneumonia seen on XR

## 2025-03-22 NOTE — ED PROVIDER NOTE - PHYSICAL EXAMINATION
General: Well appearing female in no acute distress  HEENT: Normocephalic, atraumatic. Moist mucous membranes. Oropharynx clear. No lymphadenopathy.  Eyes: No scleral icterus. EOMI. ERIKA.  Neck:. Soft and supple. Full ROM without pain. No midline tenderness  Cardiac: Regular rate and regular rhythm. No murmurs, rubs, gallops. Peripheral pulses 2+ and symmetric. No LE edema.  Resp: Lungs CTAB. Speaking in full sentences. + b/l wheezes  Abd: Soft, non-tender, non-distended. No guarding or rebound  Back: Spine midline and non-tender. No CVA tenderness.    Skin: No rashes, abrasions, or lacerations.  Neuro: AO x 3. Moves all extremities symmetrically. Motor strength and sensation grossly intact.

## 2025-03-22 NOTE — ED PROVIDER NOTE - OBJECTIVE STATEMENT
79 F pmh HTN, HLD, DM, asthma presenting to the ED for cough and shortness of breath. Patient with known lung nodules and she had a PET scan done yesterday. After leaving she has been having a constant cough with wheezing and difficulty breathing

## 2025-03-22 NOTE — ED PROVIDER NOTE - CONSIDERATION OF ADMISSION OBSERVATION
will admit for acute asthma exacerbation requiring multiple nebs, mag, steroids  + RSV infection Consideration of Admission/Observation

## 2025-03-22 NOTE — H&P ADULT - NSHPLABSRESULTS_GEN_ALL_CORE
.  LABS:                         12.0   9.97  )-----------( 250      ( 22 Mar 2025 00:22 )             37.8     03-22    134[L]  |  103  |  38[H]  ----------------------------<  213[H]  4.7   |  26  |  1.84[H]    Ca    9.1      22 Mar 2025 00:22  Mg     2.1     03-22    TPro  7.0  /  Alb  3.2[L]  /  TBili  0.5  /  DBili  x   /  AST  22  /  ALT  24  /  AlkPhos  133[H]  03-22      Urinalysis Basic - ( 22 Mar 2025 00:22 )    Color: x / Appearance: x / SG: x / pH: x  Gluc: 213 mg/dL / Ketone: x  / Bili: x / Urobili: x   Blood: x / Protein: x / Nitrite: x   Leuk Esterase: x / RBC: x / WBC x   Sq Epi: x / Non Sq Epi: x / Bacteria: x            RADIOLOGY, EKG & ADDITIONAL TESTS: Reviewed.

## 2025-03-22 NOTE — H&P ADULT - NSHPPHYSICALEXAM_GEN_ALL_CORE
Vital Signs Last 24 Hrs  T(C): 36.6 (22 Mar 2025 08:48), Max: 36.8 (21 Mar 2025 22:55)  T(F): 97.8 (22 Mar 2025 08:48), Max: 98.2 (21 Mar 2025 22:55)  HR: 101 (22 Mar 2025 08:48) (82 - 101)  BP: 138/56 (22 Mar 2025 08:48) (111/47 - 138/56)  BP(mean): 68 (21 Mar 2025 23:32) (68 - 68)  RR: 19 (22 Mar 2025 08:48) (19 - 20)  SpO2: 97% (22 Mar 2025 08:48) (95% - 100%)    Parameters below as of 22 Mar 2025 08:48  Patient On (Oxygen Delivery Method): nasal cannula  O2 Flow (L/min): 2    GENERAL: NAD, lying in bed comfortably  HEAD:  Atraumatic, normocephalic  EYES: EOMI, PERRL  NECK: Supple, trachea midline, no JVD  HEART: Regular rate and rhythm  LUNGS: Unlabored respirations.  Clear to auscultation bilaterally, no crackles, wheezing, or rhonchi  ABDOMEN: Soft, nontender, nondistended, +BS  EXTREMITIES: 2+ peripheral pulses bilaterally. No clubbing, cyanosis, or edema  NERVOUS SYSTEM:  A&Ox3, moving all extremities, no focal deficits

## 2025-03-22 NOTE — PATIENT PROFILE ADULT - FALL HARM RISK - RISK INTERVENTIONS

## 2025-03-23 LAB
A1C WITH ESTIMATED AVERAGE GLUCOSE RESULT: 9.3 % — HIGH (ref 4–5.6)
ALBUMIN SERPL ELPH-MCNC: 2.7 G/DL — LOW (ref 3.3–5)
ALP SERPL-CCNC: 135 U/L — HIGH (ref 40–120)
ALT FLD-CCNC: 19 U/L — SIGNIFICANT CHANGE UP (ref 12–78)
ANION GAP SERPL CALC-SCNC: 7 MMOL/L — SIGNIFICANT CHANGE UP (ref 5–17)
AST SERPL-CCNC: 14 U/L — LOW (ref 15–37)
BASOPHILS # BLD AUTO: 0.02 K/UL — SIGNIFICANT CHANGE UP (ref 0–0.2)
BASOPHILS NFR BLD AUTO: 0.2 % — SIGNIFICANT CHANGE UP (ref 0–2)
BILIRUB SERPL-MCNC: 0.3 MG/DL — SIGNIFICANT CHANGE UP (ref 0.2–1.2)
BUN SERPL-MCNC: 53 MG/DL — HIGH (ref 7–23)
CALCIUM SERPL-MCNC: 8.8 MG/DL — SIGNIFICANT CHANGE UP (ref 8.5–10.1)
CHLORIDE SERPL-SCNC: 100 MMOL/L — SIGNIFICANT CHANGE UP (ref 96–108)
CO2 SERPL-SCNC: 25 MMOL/L — SIGNIFICANT CHANGE UP (ref 22–31)
CREAT SERPL-MCNC: 2.23 MG/DL — HIGH (ref 0.5–1.3)
EGFR: 22 ML/MIN/1.73M2 — LOW
EGFR: 22 ML/MIN/1.73M2 — LOW
EOSINOPHIL # BLD AUTO: 0 K/UL — SIGNIFICANT CHANGE UP (ref 0–0.5)
EOSINOPHIL NFR BLD AUTO: 0 % — SIGNIFICANT CHANGE UP (ref 0–6)
ESTIMATED AVERAGE GLUCOSE: 220 MG/DL — HIGH (ref 68–114)
GLUCOSE BLDC GLUCOMTR-MCNC: 288 MG/DL — HIGH (ref 70–99)
GLUCOSE BLDC GLUCOMTR-MCNC: 346 MG/DL — HIGH (ref 70–99)
GLUCOSE BLDC GLUCOMTR-MCNC: 360 MG/DL — HIGH (ref 70–99)
GLUCOSE BLDC GLUCOMTR-MCNC: 564 MG/DL — CRITICAL HIGH (ref 70–99)
GLUCOSE BLDC GLUCOMTR-MCNC: 586 MG/DL — CRITICAL HIGH (ref 70–99)
GLUCOSE SERPL-MCNC: 645 MG/DL — CRITICAL HIGH (ref 70–99)
HCT VFR BLD CALC: 37 % — SIGNIFICANT CHANGE UP (ref 34.5–45)
HGB BLD-MCNC: 11.7 G/DL — SIGNIFICANT CHANGE UP (ref 11.5–15.5)
IMM GRANULOCYTES NFR BLD AUTO: 0.5 % — SIGNIFICANT CHANGE UP (ref 0–0.9)
LYMPHOCYTES # BLD AUTO: 0.72 K/UL — LOW (ref 1–3.3)
LYMPHOCYTES # BLD AUTO: 5.6 % — LOW (ref 13–44)
MCHC RBC-ENTMCNC: 27.1 PG — SIGNIFICANT CHANGE UP (ref 27–34)
MCHC RBC-ENTMCNC: 31.6 G/DL — LOW (ref 32–36)
MCV RBC AUTO: 85.6 FL — SIGNIFICANT CHANGE UP (ref 80–100)
MONOCYTES # BLD AUTO: 0.2 K/UL — SIGNIFICANT CHANGE UP (ref 0–0.9)
MONOCYTES NFR BLD AUTO: 1.5 % — LOW (ref 2–14)
NEUTROPHILS # BLD AUTO: 11.91 K/UL — HIGH (ref 1.8–7.4)
NEUTROPHILS NFR BLD AUTO: 92.2 % — HIGH (ref 43–77)
NRBC BLD AUTO-RTO: 0 /100 WBCS — SIGNIFICANT CHANGE UP (ref 0–0)
PLATELET # BLD AUTO: 264 K/UL — SIGNIFICANT CHANGE UP (ref 150–400)
POTASSIUM SERPL-MCNC: 4.5 MMOL/L — SIGNIFICANT CHANGE UP (ref 3.5–5.3)
POTASSIUM SERPL-MCNC: 6 MMOL/L — HIGH (ref 3.5–5.3)
POTASSIUM SERPL-SCNC: 4.5 MMOL/L — SIGNIFICANT CHANGE UP (ref 3.5–5.3)
POTASSIUM SERPL-SCNC: 6 MMOL/L — HIGH (ref 3.5–5.3)
PROT SERPL-MCNC: 6.9 GM/DL — SIGNIFICANT CHANGE UP (ref 6–8.3)
RBC # BLD: 4.32 M/UL — SIGNIFICANT CHANGE UP (ref 3.8–5.2)
RBC # FLD: 14.8 % — HIGH (ref 10.3–14.5)
SODIUM SERPL-SCNC: 132 MMOL/L — LOW (ref 135–145)
WBC # BLD: 12.92 K/UL — HIGH (ref 3.8–10.5)
WBC # FLD AUTO: 12.92 K/UL — HIGH (ref 3.8–10.5)

## 2025-03-23 PROCEDURE — 99231 SBSQ HOSP IP/OBS SF/LOW 25: CPT

## 2025-03-23 PROCEDURE — 99232 SBSQ HOSP IP/OBS MODERATE 35: CPT

## 2025-03-23 RX ORDER — FUROSEMIDE 10 MG/ML
40 INJECTION INTRAMUSCULAR; INTRAVENOUS DAILY
Refills: 0 | Status: DISCONTINUED | OUTPATIENT
Start: 2025-03-24 | End: 2025-03-27

## 2025-03-23 RX ORDER — METHYLPREDNISOLONE ACETATE 80 MG/ML
40 INJECTION, SUSPENSION INTRA-ARTICULAR; INTRALESIONAL; INTRAMUSCULAR; SOFT TISSUE EVERY 24 HOURS
Refills: 0 | Status: DISCONTINUED | OUTPATIENT
Start: 2025-03-24 | End: 2025-03-27

## 2025-03-23 RX ORDER — FUROSEMIDE 10 MG/ML
40 INJECTION INTRAMUSCULAR; INTRAVENOUS DAILY
Refills: 0 | Status: DISCONTINUED | OUTPATIENT
Start: 2025-03-23 | End: 2025-03-23

## 2025-03-23 RX ORDER — LEVOTHYROXINE SODIUM 300 MCG
75 TABLET ORAL DAILY
Refills: 0 | Status: DISCONTINUED | OUTPATIENT
Start: 2025-03-23 | End: 2025-03-27

## 2025-03-23 RX ORDER — CARVEDILOL 3.12 MG/1
6.25 TABLET, FILM COATED ORAL EVERY 12 HOURS
Refills: 0 | Status: DISCONTINUED | OUTPATIENT
Start: 2025-03-23 | End: 2025-03-27

## 2025-03-23 RX ORDER — SODIUM ZIRCONIUM CYCLOSILICATE 5 G/5G
10 POWDER, FOR SUSPENSION ORAL ONCE
Refills: 0 | Status: COMPLETED | OUTPATIENT
Start: 2025-03-23 | End: 2025-03-23

## 2025-03-23 RX ORDER — LOSARTAN POTASSIUM 100 MG/1
50 TABLET, FILM COATED ORAL DAILY
Refills: 0 | Status: DISCONTINUED | OUTPATIENT
Start: 2025-03-23 | End: 2025-03-27

## 2025-03-23 RX ORDER — INSULIN LISPRO 100 U/ML
25 INJECTION, SOLUTION INTRAVENOUS; SUBCUTANEOUS
Refills: 0 | Status: DISCONTINUED | OUTPATIENT
Start: 2025-03-23 | End: 2025-03-24

## 2025-03-23 RX ORDER — QUETIAPINE FUMARATE 25 MG/1
25 TABLET ORAL AT BEDTIME
Refills: 0 | Status: DISCONTINUED | OUTPATIENT
Start: 2025-03-23 | End: 2025-03-27

## 2025-03-23 RX ORDER — ATORVASTATIN CALCIUM 80 MG/1
80 TABLET, FILM COATED ORAL AT BEDTIME
Refills: 0 | Status: DISCONTINUED | OUTPATIENT
Start: 2025-03-23 | End: 2025-03-27

## 2025-03-23 RX ORDER — FUROSEMIDE 10 MG/ML
40 INJECTION INTRAMUSCULAR; INTRAVENOUS ONCE
Refills: 0 | Status: COMPLETED | OUTPATIENT
Start: 2025-03-23 | End: 2025-03-23

## 2025-03-23 RX ORDER — INSULIN GLARGINE-YFGN 100 [IU]/ML
35 INJECTION, SOLUTION SUBCUTANEOUS EVERY MORNING
Refills: 0 | Status: DISCONTINUED | OUTPATIENT
Start: 2025-03-23 | End: 2025-03-27

## 2025-03-23 RX ADMIN — Medication 3 MILLIGRAM(S): at 21:50

## 2025-03-23 RX ADMIN — Medication 75 MILLILITER(S): at 11:05

## 2025-03-23 RX ADMIN — CARVEDILOL 6.25 MILLIGRAM(S): 3.12 TABLET, FILM COATED ORAL at 17:14

## 2025-03-23 RX ADMIN — Medication 650 MILLIGRAM(S): at 00:10

## 2025-03-23 RX ADMIN — Medication 650 MILLIGRAM(S): at 08:47

## 2025-03-23 RX ADMIN — INSULIN LISPRO 8: 100 INJECTION, SOLUTION INTRAVENOUS; SUBCUTANEOUS at 16:42

## 2025-03-23 RX ADMIN — FUROSEMIDE 40 MILLIGRAM(S): 10 INJECTION INTRAMUSCULAR; INTRAVENOUS at 11:05

## 2025-03-23 RX ADMIN — QUETIAPINE FUMARATE 25 MILLIGRAM(S): 25 TABLET ORAL at 21:50

## 2025-03-23 RX ADMIN — Medication 75 MICROGRAM(S): at 11:07

## 2025-03-23 RX ADMIN — IPRATROPIUM BROMIDE AND ALBUTEROL SULFATE 3 MILLILITER(S): .5; 2.5 SOLUTION RESPIRATORY (INHALATION) at 17:04

## 2025-03-23 RX ADMIN — INSULIN LISPRO 1: 100 INJECTION, SOLUTION INTRAVENOUS; SUBCUTANEOUS at 21:50

## 2025-03-23 RX ADMIN — INSULIN LISPRO 12: 100 INJECTION, SOLUTION INTRAVENOUS; SUBCUTANEOUS at 11:06

## 2025-03-23 RX ADMIN — INSULIN LISPRO 25 UNIT(S): 100 INJECTION, SOLUTION INTRAVENOUS; SUBCUTANEOUS at 16:42

## 2025-03-23 RX ADMIN — LOSARTAN POTASSIUM 50 MILLIGRAM(S): 100 TABLET, FILM COATED ORAL at 11:05

## 2025-03-23 RX ADMIN — INSULIN LISPRO 25 UNIT(S): 100 INJECTION, SOLUTION INTRAVENOUS; SUBCUTANEOUS at 09:06

## 2025-03-23 RX ADMIN — METHYLPREDNISOLONE ACETATE 40 MILLIGRAM(S): 80 INJECTION, SUSPENSION INTRA-ARTICULAR; INTRALESIONAL; INTRAMUSCULAR; SOFT TISSUE at 05:59

## 2025-03-23 RX ADMIN — INSULIN LISPRO 25 UNIT(S): 100 INJECTION, SOLUTION INTRAVENOUS; SUBCUTANEOUS at 11:05

## 2025-03-23 RX ADMIN — Medication 650 MILLIGRAM(S): at 15:25

## 2025-03-23 RX ADMIN — IPRATROPIUM BROMIDE AND ALBUTEROL SULFATE 3 MILLILITER(S): .5; 2.5 SOLUTION RESPIRATORY (INHALATION) at 11:07

## 2025-03-23 RX ADMIN — IPRATROPIUM BROMIDE AND ALBUTEROL SULFATE 3 MILLILITER(S): .5; 2.5 SOLUTION RESPIRATORY (INHALATION) at 05:29

## 2025-03-23 RX ADMIN — IPRATROPIUM BROMIDE AND ALBUTEROL SULFATE 3 MILLILITER(S): .5; 2.5 SOLUTION RESPIRATORY (INHALATION) at 23:38

## 2025-03-23 RX ADMIN — Medication 650 MILLIGRAM(S): at 17:05

## 2025-03-23 RX ADMIN — ATORVASTATIN CALCIUM 80 MILLIGRAM(S): 80 TABLET, FILM COATED ORAL at 21:50

## 2025-03-23 RX ADMIN — SODIUM ZIRCONIUM CYCLOSILICATE 10 GRAM(S): 5 POWDER, FOR SUSPENSION ORAL at 12:13

## 2025-03-23 RX ADMIN — Medication 650 MILLIGRAM(S): at 07:49

## 2025-03-23 RX ADMIN — INSULIN GLARGINE-YFGN 35 UNIT(S): 100 INJECTION, SOLUTION SUBCUTANEOUS at 10:02

## 2025-03-23 NOTE — PROVIDER CONTACT NOTE (OTHER) - SITUATION
Pt has fs 564
Pt has fs of 586 and is on methylprednisolone. Based on current insulin orders, pt is to get 14 units admelog. DOMINIQUE Dotson made aware and will come speak to pt regarding proper coverage.

## 2025-03-23 NOTE — CHART NOTE - NSCHARTNOTEFT_GEN_A_CORE
seen and examined  breathing better continue steroids today      Vital Signs Last 24 Hrs  T(C): 36.6 (22 Mar 2025 08:48), Max: 36.8 (21 Mar 2025 22:55)  T(F): 97.8 (22 Mar 2025 08:48), Max: 98.2 (21 Mar 2025 22:55)  HR: 101 (22 Mar 2025 08:48) (82 - 101)  BP: 138/56 (22 Mar 2025 08:48) (111/47 - 138/56)  BP(mean): 68 (21 Mar 2025 23:32) (68 - 68)  RR: 19 (22 Mar 2025 08:48) (19 - 20)  SpO2: 97% (22 Mar 2025 08:48) (95% - 100%)    Parameters below as of 22 Mar 2025 08:48  Patient On (Oxygen Delivery Method): nasal cannula  O2 Flow (L/min): 2                        12.0   9.97  )-----------( 250      ( 22 Mar 2025 00:22 )             37.8     03-22    134[L]  |  103  |  38[H]  ----------------------------<  213[H]  4.7   |  26  |  1.84[H]    Ca    9.1      22 Mar 2025 00:22  Mg     2.1     03-22    TPro  7.0  /  Alb  3.2[L]  /  TBili  0.5  /  DBili  x   /  AST  22  /  ALT  24  /  AlkPhos  133[H]  03-22      Urinalysis Basic - ( 22 Mar 2025 00:22 )    Color: x / Appearance: x / SG: x / pH: x  Gluc: 213 mg/dL / Ketone: x  / Bili: x / Urobili: x   Blood: x / Protein: x / Nitrite: x   Leuk Esterase: x / RBC: x / WBC x   Sq Epi: x / Non Sq Epi: x / Bacteria: x
Notified by RN that patient's FS was 586 mg/dL (has been 500s since yesterday).  Spoke to patient and her granddaughter on the phone who state that patient takes 35units lantus at bedtime and takes between 25-35units admelog pre-meal based on her finger sticks.    -Will place patient back on her home insulin regimen  -F/u HgA1C  -Dr Giordano will re-evaluate continued need for steroids  -Attempted to call patient's outpatient pharmacy [facility]  @ [phone] to confirm dose however no answer  -D/w Dr Giordano    25 mins assessing presenting problems of acute illness, medical decision making including Initiating plan of care, reviewing data, reviewing radiology, discussing with multidisciplinary team, non inclusive of procedures, discussing goals of care with patient/family

## 2025-03-23 NOTE — PROGRESS NOTE ADULT - ASSESSMENT
79 F pmh HTN, DM, asthma, lung cancer, presents today for cough and shortness of breath, Admitted for acute asthma exacerbation 02/02 RSV infection     Acute asthma exacerbation to RSV infection   The patient presents with cough and shortness of breath x3 days. RSV positive.   - Solu-Medrol    - DuoNebs     Diabetes mellitus with Hyperglycemia , steroid effect   - resume home lantus/humalog dose     Hyperkalemia/hyponatremia   -likely 2/2 hyperglycemia  -replace     WILBER  -will give gentle hydration     HTN/HLD   -resume home meds     Code status: DNR/DNI  The patient cannot recall her medication list. Medication list listed in chart is not updated. Please contact Pharmacy in the AM to verify medication list  79 F pmh HTN, DM, asthma, lung cancer, presents today for cough and shortness of breath, Admitted for acute asthma exacerbation 02/02 RSV infection     Acute asthma exacerbation to RSV infection   The patient presents with cough and shortness of breath x3 days. RSV positive.   - Solu-Medrol    - DuoNebs     Diabetes mellitus with Hyperglycemia , steroid effect   - resume home lantus/humalog dose   -monitor finger sticks closely today     Hyperkalemia/hyponatremia   -likely 2/2 hyperglycemia  -lokelma     WILBER  -will give gentle hydration     HTN/HLD   -resume home meds     Code status: DNR/DNI  The patient cannot recall her medication list. Medication list listed in chart is not updated. Please contact Pharmacy in the AM to verify medication list

## 2025-03-23 NOTE — PROGRESS NOTE ADULT - SUBJECTIVE AND OBJECTIVE BOX
Patient is a 79y old  Female who presents with a chief complaint of Asthma exacerbation 2/2 RSV infection (22 Mar 2025 07:59)    INTERVAL HPI/OVERNIGHT EVENTS:    MEDICATIONS  (STANDING):  albuterol/ipratropium for Nebulization 3 milliLiter(s) Nebulizer every 6 hours  atorvastatin 80 milliGRAM(s) Oral at bedtime  carvedilol 6.25 milliGRAM(s) Oral every 12 hours  dextrose 5%. 1000 milliLiter(s) (50 mL/Hr) IV Continuous <Continuous>  dextrose 50% Injectable 25 Gram(s) IV Push once  furosemide    Tablet 40 milliGRAM(s) Oral daily  glucagon  Injectable 1 milliGRAM(s) IntraMuscular once  insulin glargine Injectable (LANTUS) 35 Unit(s) SubCutaneous every morning  insulin lispro (ADMELOG) corrective regimen sliding scale   SubCutaneous at bedtime  insulin lispro (ADMELOG) corrective regimen sliding scale   SubCutaneous three times a day before meals  insulin lispro Injectable (ADMELOG) 25 Unit(s) SubCutaneous three times a day before meals  levothyroxine 75 MICROGram(s) Oral daily  losartan 50 milliGRAM(s) Oral daily  methylPREDNISolone sodium succinate Injectable 40 milliGRAM(s) IV Push every 24 hours  QUEtiapine 25 milliGRAM(s) Oral at bedtime  sodium chloride 0.9%. 1000 milliLiter(s) (75 mL/Hr) IV Continuous <Continuous>  sodium zirconium cyclosilicate 10 Gram(s) Oral once    MEDICATIONS  (PRN):  acetaminophen     Tablet .. 650 milliGRAM(s) Oral every 6 hours PRN Temp greater or equal to 38C (100.4F), Mild Pain (1 - 3)  aluminum hydroxide/magnesium hydroxide/simethicone Suspension 30 milliLiter(s) Oral every 4 hours PRN Dyspepsia  dextrose Oral Gel 15 Gram(s) Oral once PRN Blood Glucose LESS THAN 70 milliGRAM(s)/deciliter  melatonin 3 milliGRAM(s) Oral at bedtime PRN Insomnia  ondansetron Injectable 4 milliGRAM(s) IV Push every 8 hours PRN Nausea and/or Vomiting    Allergies    No Known Allergies    Intolerances      REVIEW OF SYSTEMS:  All other systems reviewed and are negative    Vital Signs Last 24 Hrs  T(C): 36.6 (23 Mar 2025 10:04), Max: 37.2 (22 Mar 2025 15:01)  T(F): 97.9 (23 Mar 2025 10:04), Max: 98.9 (22 Mar 2025 15:01)  HR: 78 (23 Mar 2025 10:04) (58 - 103)  BP: 131/67 (23 Mar 2025 10:04) (131/67 - 150/70)  BP(mean): --  RR: 18 (23 Mar 2025 10:04) (18 - 19)  SpO2: 95% (23 Mar 2025 10:04) (93% - 95%)    Parameters below as of 23 Mar 2025 07:50  Patient On (Oxygen Delivery Method): nasal cannula, 2LPM      Daily     Daily Weight in k.8 (23 Mar 2025 04:37)  I&O's Summary    22 Mar 2025 07:01  -  23 Mar 2025 07:00  --------------------------------------------------------  IN: 0 mL / OUT: 4100 mL / NET: -4100 mL    23 Mar 2025 07:01  -  23 Mar 2025 10:43  --------------------------------------------------------  IN: 220 mL / OUT: 0 mL / NET: 220 mL      CAPILLARY BLOOD GLUCOSE      POCT Blood Glucose.: 586 mg/dL (23 Mar 2025 08:12)  POCT Blood Glucose.: 544 mg/dL (22 Mar 2025 21:18)  POCT Blood Glucose.: 505 mg/dL (22 Mar 2025 21:17)  POCT Blood Glucose.: 561 mg/dL (22 Mar 2025 16:44)  POCT Blood Glucose.: 511 mg/dL (22 Mar 2025 16:43)    PHYSICAL EXAM:  GENERAL: NAD,    HEAD:  Atraumatic, Normocephalic  EYES: EOMI, PERRLA, conjunctiva and sclera clear  ENMT: No tonsillar erythema, exudates, or enlargement; Moist mucous membranes, Good dentition, No lesions  NECK: Supple, No JVD, Normal thyroid  NERVOUS SYSTEM:  Alert & Oriented X3, Good concentration; Motor Strength 5/5 B/L upper and lower extremities; DTRs 2+ intact and symmetric  CHEST/LUNG: Clear to percussion bilaterally; No rales, rhonchi, wheezing, or rubs  HEART: Regular rate and rhythm; No murmurs, rubs, or gallops  ABDOMEN: Soft, Nontender, Nondistended; Bowel sounds present  EXTREMITIES:  2+ Peripheral Pulses, No clubbing, cyanosis, or edema  LYMPH: No lymphadenopathy noted  SKIN: No rashes or lesions    Labs                          11.7   12.92 )-----------( 264      ( 23 Mar 2025 07:05 )             37.0     03-    132[L]  |  100  |  53[H]  ----------------------------<  645[HH]  6.0[H]   |  25  |  2.23[H]    Ca    8.8      23 Mar 2025 07:05  Mg     2.1     03-22    TPro  6.9  /  Alb  2.7[L]  /  TBili  0.3  /  DBili  x   /  AST  14[L]  /  ALT  19  /  AlkPhos  135[H]  03-23          Urinalysis Basic - ( 23 Mar 2025 07:05 )    Color: x / Appearance: x / SG: x / pH: x  Gluc: 645 mg/dL / Ketone: x  / Bili: x / Urobili: x   Blood: x / Protein: x / Nitrite: x   Leuk Esterase: x / RBC: x / WBC x   Sq Epi: x / Non Sq Epi: x / Bacteria: x                  DVT prophylaxis: > Lovenox 40mg SQ daily  > Heparin   > SCD's

## 2025-03-24 LAB
ALBUMIN SERPL ELPH-MCNC: 2.7 G/DL — LOW (ref 3.3–5)
ALP SERPL-CCNC: 105 U/L — SIGNIFICANT CHANGE UP (ref 40–120)
ALT FLD-CCNC: 18 U/L — SIGNIFICANT CHANGE UP (ref 12–78)
ANION GAP SERPL CALC-SCNC: 3 MMOL/L — LOW (ref 5–17)
AST SERPL-CCNC: 16 U/L — SIGNIFICANT CHANGE UP (ref 15–37)
BILIRUB SERPL-MCNC: 0.3 MG/DL — SIGNIFICANT CHANGE UP (ref 0.2–1.2)
BUN SERPL-MCNC: 69 MG/DL — HIGH (ref 7–23)
CALCIUM SERPL-MCNC: 8.6 MG/DL — SIGNIFICANT CHANGE UP (ref 8.5–10.1)
CHLORIDE SERPL-SCNC: 106 MMOL/L — SIGNIFICANT CHANGE UP (ref 96–108)
CO2 SERPL-SCNC: 25 MMOL/L — SIGNIFICANT CHANGE UP (ref 22–31)
CREAT SERPL-MCNC: 1.78 MG/DL — HIGH (ref 0.5–1.3)
EGFR: 29 ML/MIN/1.73M2 — LOW
EGFR: 29 ML/MIN/1.73M2 — LOW
GLUCOSE BLDC GLUCOMTR-MCNC: 248 MG/DL — HIGH (ref 70–99)
GLUCOSE BLDC GLUCOMTR-MCNC: 248 MG/DL — HIGH (ref 70–99)
GLUCOSE BLDC GLUCOMTR-MCNC: 313 MG/DL — HIGH (ref 70–99)
GLUCOSE BLDC GLUCOMTR-MCNC: 328 MG/DL — HIGH (ref 70–99)
GLUCOSE SERPL-MCNC: 304 MG/DL — HIGH (ref 70–99)
HCT VFR BLD CALC: 37.9 % — SIGNIFICANT CHANGE UP (ref 34.5–45)
HGB BLD-MCNC: 11.9 G/DL — SIGNIFICANT CHANGE UP (ref 11.5–15.5)
MCHC RBC-ENTMCNC: 27.2 PG — SIGNIFICANT CHANGE UP (ref 27–34)
MCHC RBC-ENTMCNC: 31.4 G/DL — LOW (ref 32–36)
MCV RBC AUTO: 86.7 FL — SIGNIFICANT CHANGE UP (ref 80–100)
NRBC BLD AUTO-RTO: 0 /100 WBCS — SIGNIFICANT CHANGE UP (ref 0–0)
PLATELET # BLD AUTO: 274 K/UL — SIGNIFICANT CHANGE UP (ref 150–400)
POTASSIUM SERPL-MCNC: 5.2 MMOL/L — SIGNIFICANT CHANGE UP (ref 3.5–5.3)
POTASSIUM SERPL-SCNC: 5.2 MMOL/L — SIGNIFICANT CHANGE UP (ref 3.5–5.3)
PROT SERPL-MCNC: 6.4 GM/DL — SIGNIFICANT CHANGE UP (ref 6–8.3)
RBC # BLD: 4.37 M/UL — SIGNIFICANT CHANGE UP (ref 3.8–5.2)
RBC # FLD: 15 % — HIGH (ref 10.3–14.5)
SODIUM SERPL-SCNC: 134 MMOL/L — LOW (ref 135–145)
WBC # BLD: 16 K/UL — HIGH (ref 3.8–10.5)
WBC # FLD AUTO: 16 K/UL — HIGH (ref 3.8–10.5)

## 2025-03-24 PROCEDURE — 99232 SBSQ HOSP IP/OBS MODERATE 35: CPT

## 2025-03-24 RX ORDER — INSULIN LISPRO 100 U/ML
27 INJECTION, SOLUTION INTRAVENOUS; SUBCUTANEOUS
Refills: 0 | Status: DISCONTINUED | OUTPATIENT
Start: 2025-03-24 | End: 2025-03-27

## 2025-03-24 RX ADMIN — Medication 75 MILLILITER(S): at 02:48

## 2025-03-24 RX ADMIN — INSULIN LISPRO 8: 100 INJECTION, SOLUTION INTRAVENOUS; SUBCUTANEOUS at 11:20

## 2025-03-24 RX ADMIN — CARVEDILOL 6.25 MILLIGRAM(S): 3.12 TABLET, FILM COATED ORAL at 17:43

## 2025-03-24 RX ADMIN — INSULIN GLARGINE-YFGN 35 UNIT(S): 100 INJECTION, SOLUTION SUBCUTANEOUS at 08:15

## 2025-03-24 RX ADMIN — ATORVASTATIN CALCIUM 80 MILLIGRAM(S): 80 TABLET, FILM COATED ORAL at 21:17

## 2025-03-24 RX ADMIN — CARVEDILOL 6.25 MILLIGRAM(S): 3.12 TABLET, FILM COATED ORAL at 08:14

## 2025-03-24 RX ADMIN — FUROSEMIDE 40 MILLIGRAM(S): 10 INJECTION INTRAMUSCULAR; INTRAVENOUS at 05:04

## 2025-03-24 RX ADMIN — METHYLPREDNISOLONE ACETATE 40 MILLIGRAM(S): 80 INJECTION, SUSPENSION INTRA-ARTICULAR; INTRALESIONAL; INTRAMUSCULAR; SOFT TISSUE at 05:04

## 2025-03-24 RX ADMIN — QUETIAPINE FUMARATE 25 MILLIGRAM(S): 25 TABLET ORAL at 21:17

## 2025-03-24 RX ADMIN — INSULIN LISPRO 25 UNIT(S): 100 INJECTION, SOLUTION INTRAVENOUS; SUBCUTANEOUS at 08:15

## 2025-03-24 RX ADMIN — INSULIN LISPRO 27 UNIT(S): 100 INJECTION, SOLUTION INTRAVENOUS; SUBCUTANEOUS at 17:44

## 2025-03-24 RX ADMIN — INSULIN LISPRO 4: 100 INJECTION, SOLUTION INTRAVENOUS; SUBCUTANEOUS at 17:44

## 2025-03-24 RX ADMIN — Medication 75 MICROGRAM(S): at 05:07

## 2025-03-24 RX ADMIN — Medication 75 MILLILITER(S): at 21:21

## 2025-03-24 RX ADMIN — LOSARTAN POTASSIUM 50 MILLIGRAM(S): 100 TABLET, FILM COATED ORAL at 05:09

## 2025-03-24 RX ADMIN — IPRATROPIUM BROMIDE AND ALBUTEROL SULFATE 3 MILLILITER(S): .5; 2.5 SOLUTION RESPIRATORY (INHALATION) at 11:09

## 2025-03-24 RX ADMIN — INSULIN LISPRO 8: 100 INJECTION, SOLUTION INTRAVENOUS; SUBCUTANEOUS at 08:14

## 2025-03-24 RX ADMIN — IPRATROPIUM BROMIDE AND ALBUTEROL SULFATE 3 MILLILITER(S): .5; 2.5 SOLUTION RESPIRATORY (INHALATION) at 17:04

## 2025-03-24 RX ADMIN — INSULIN LISPRO 25 UNIT(S): 100 INJECTION, SOLUTION INTRAVENOUS; SUBCUTANEOUS at 11:20

## 2025-03-24 RX ADMIN — IPRATROPIUM BROMIDE AND ALBUTEROL SULFATE 3 MILLILITER(S): .5; 2.5 SOLUTION RESPIRATORY (INHALATION) at 23:50

## 2025-03-24 RX ADMIN — IPRATROPIUM BROMIDE AND ALBUTEROL SULFATE 3 MILLILITER(S): .5; 2.5 SOLUTION RESPIRATORY (INHALATION) at 05:10

## 2025-03-24 NOTE — PROGRESS NOTE ADULT - ASSESSMENT
79 F pmh HTN, DM, asthma, lung cancer, presents today for cough and shortness of breath, Admitted for acute asthma exacerbation 02/02 RSV infection     Acute asthma exacerbation to RSV infection   The patient presents with cough and shortness of breath x3 days. RSV positive.   - Solu-Medrol    - DuoNebs     Diabetes mellitus with Hyperglycemia , steroid effect   - resume home lantus/humalog dose   -monitor finger sticks closely today     Hyperkalemia/hyponatremia   -likely 2/2 hyperglycemia  -lokelma     WILBER  -will give gentle hydration     HTN/HLD   -resume home meds     Code status: DNR/DNI

## 2025-03-24 NOTE — PROGRESS NOTE ADULT - SUBJECTIVE AND OBJECTIVE BOX
Patient is a 79y old  Female who presents with a chief complaint of Asthma exacerbation 2/2 RSV infection (23 Mar 2025 10:43)    INTERVAL HPI/OVERNIGHT EVENTS:    MEDICATIONS  (STANDING):  albuterol/ipratropium for Nebulization 3 milliLiter(s) Nebulizer every 6 hours  atorvastatin 80 milliGRAM(s) Oral at bedtime  carvedilol 6.25 milliGRAM(s) Oral every 12 hours  dextrose 5%. 1000 milliLiter(s) (50 mL/Hr) IV Continuous <Continuous>  dextrose 50% Injectable 25 Gram(s) IV Push once  furosemide    Tablet 40 milliGRAM(s) Oral daily  glucagon  Injectable 1 milliGRAM(s) IntraMuscular once  insulin glargine Injectable (LANTUS) 35 Unit(s) SubCutaneous every morning  insulin lispro (ADMELOG) corrective regimen sliding scale   SubCutaneous at bedtime  insulin lispro (ADMELOG) corrective regimen sliding scale   SubCutaneous three times a day before meals  insulin lispro Injectable (ADMELOG) 27 Unit(s) SubCutaneous three times a day before meals  levothyroxine 75 MICROGram(s) Oral daily  losartan 50 milliGRAM(s) Oral daily  methylPREDNISolone sodium succinate Injectable 40 milliGRAM(s) IV Push every 24 hours  QUEtiapine 25 milliGRAM(s) Oral at bedtime  sodium chloride 0.9%. 1000 milliLiter(s) (75 mL/Hr) IV Continuous <Continuous>    MEDICATIONS  (PRN):  acetaminophen     Tablet .. 650 milliGRAM(s) Oral every 6 hours PRN Temp greater or equal to 38C (100.4F), Mild Pain (1 - 3)  aluminum hydroxide/magnesium hydroxide/simethicone Suspension 30 milliLiter(s) Oral every 4 hours PRN Dyspepsia  dextrose Oral Gel 15 Gram(s) Oral once PRN Blood Glucose LESS THAN 70 milliGRAM(s)/deciliter  melatonin 3 milliGRAM(s) Oral at bedtime PRN Insomnia  ondansetron Injectable 4 milliGRAM(s) IV Push every 8 hours PRN Nausea and/or Vomiting    Allergies    No Known Allergies    Intolerances      REVIEW OF SYSTEMS:  All other systems reviewed and are negative    Vital Signs Last 24 Hrs  T(C): 36.7 (24 Mar 2025 10:18), Max: 36.7 (24 Mar 2025 10:18)  T(F): 98.1 (24 Mar 2025 10:18), Max: 98.1 (24 Mar 2025 10:18)  HR: 54 (24 Mar 2025 11:10) (54 - 84)  BP: 108/66 (24 Mar 2025 10:18) (104/61 - 127/66)  BP(mean): --  RR: 18 (24 Mar 2025 10:18) (17 - 18)  SpO2: 95% (24 Mar 2025 11:10) (94% - 98%)    Parameters below as of 24 Mar 2025 11:10  Patient On (Oxygen Delivery Method): nasal cannula      Daily     Daily   I&O's Summary    23 Mar 2025 07:01  -  24 Mar 2025 07:00  --------------------------------------------------------  IN: 660 mL / OUT: 1300 mL / NET: -640 mL    24 Mar 2025 07:01  -  24 Mar 2025 12:49  --------------------------------------------------------  IN: 320 mL / OUT: 1000 mL / NET: -680 mL      CAPILLARY BLOOD GLUCOSE      POCT Blood Glucose.: 328 mg/dL (24 Mar 2025 11:01)  POCT Blood Glucose.: 313 mg/dL (24 Mar 2025 07:57)  POCT Blood Glucose.: 288 mg/dL (23 Mar 2025 20:55)  POCT Blood Glucose.: 346 mg/dL (23 Mar 2025 15:54)  POCT Blood Glucose.: 360 mg/dL (23 Mar 2025 15:49)    PHYSICAL EXAM:  GENERAL: NAD,    HEAD:  Atraumatic, Normocephalic  EYES: EOMI, PERRLA, conjunctiva and sclera clear  ENMT: No tonsillar erythema, exudates, or enlargement; Moist mucous membranes, Good dentition, No lesions  NECK: Supple, No JVD, Normal thyroid  NERVOUS SYSTEM:  Alert & Oriented X3, Good concentration; Motor Strength 5/5 B/L upper and lower extremities; DTRs 2+ intact and symmetric  CHEST/LUNG: Clear to percussion bilaterally; No rales, rhonchi, wheezing, or rubs  HEART: Regular rate and rhythm; No murmurs, rubs, or gallops  ABDOMEN: Soft, Nontender, Nondistended; Bowel sounds present  EXTREMITIES:  2+ Peripheral Pulses, No clubbing, cyanosis, or edema  LYMPH: No lymphadenopathy noted  SKIN: No rashes or lesions    Labs                          11.9   16.00 )-----------( 274      ( 24 Mar 2025 06:27 )             37.9     03-24    134[L]  |  106  |  69[H]  ----------------------------<  304[H]  5.2   |  25  |  1.78[H]    Ca    8.6      24 Mar 2025 06:27    TPro  6.4  /  Alb  2.7[L]  /  TBili  0.3  /  DBili  x   /  AST  16  /  ALT  18  /  AlkPhos  105  03-24          Urinalysis Basic - ( 24 Mar 2025 06:27 )    Color: x / Appearance: x / SG: x / pH: x  Gluc: 304 mg/dL / Ketone: x  / Bili: x / Urobili: x   Blood: x / Protein: x / Nitrite: x   Leuk Esterase: x / RBC: x / WBC x   Sq Epi: x / Non Sq Epi: x / Bacteria: x                  DVT prophylaxis: > Lovenox 40mg SQ daily  > Heparin   > SCD's

## 2025-03-25 LAB
ANION GAP SERPL CALC-SCNC: 4 MMOL/L — LOW (ref 5–17)
BUN SERPL-MCNC: 61 MG/DL — HIGH (ref 7–23)
CALCIUM SERPL-MCNC: 8.4 MG/DL — LOW (ref 8.5–10.1)
CHLORIDE SERPL-SCNC: 109 MMOL/L — HIGH (ref 96–108)
CO2 SERPL-SCNC: 26 MMOL/L — SIGNIFICANT CHANGE UP (ref 22–31)
CREAT SERPL-MCNC: 1.55 MG/DL — HIGH (ref 0.5–1.3)
EGFR: 34 ML/MIN/1.73M2 — LOW
EGFR: 34 ML/MIN/1.73M2 — LOW
GLUCOSE BLDC GLUCOMTR-MCNC: 205 MG/DL — HIGH (ref 70–99)
GLUCOSE BLDC GLUCOMTR-MCNC: 216 MG/DL — HIGH (ref 70–99)
GLUCOSE BLDC GLUCOMTR-MCNC: 248 MG/DL — HIGH (ref 70–99)
GLUCOSE BLDC GLUCOMTR-MCNC: 369 MG/DL — HIGH (ref 70–99)
GLUCOSE SERPL-MCNC: 213 MG/DL — HIGH (ref 70–99)
HCT VFR BLD CALC: 37.4 % — SIGNIFICANT CHANGE UP (ref 34.5–45)
HGB BLD-MCNC: 12 G/DL — SIGNIFICANT CHANGE UP (ref 11.5–15.5)
MCHC RBC-ENTMCNC: 27.3 PG — SIGNIFICANT CHANGE UP (ref 27–34)
MCHC RBC-ENTMCNC: 32.1 G/DL — SIGNIFICANT CHANGE UP (ref 32–36)
MCV RBC AUTO: 85 FL — SIGNIFICANT CHANGE UP (ref 80–100)
NRBC BLD AUTO-RTO: 0 /100 WBCS — SIGNIFICANT CHANGE UP (ref 0–0)
PLATELET # BLD AUTO: 269 K/UL — SIGNIFICANT CHANGE UP (ref 150–400)
POTASSIUM SERPL-MCNC: 4.9 MMOL/L — SIGNIFICANT CHANGE UP (ref 3.5–5.3)
POTASSIUM SERPL-SCNC: 4.9 MMOL/L — SIGNIFICANT CHANGE UP (ref 3.5–5.3)
RBC # BLD: 4.4 M/UL — SIGNIFICANT CHANGE UP (ref 3.8–5.2)
RBC # FLD: 15.1 % — HIGH (ref 10.3–14.5)
SODIUM SERPL-SCNC: 139 MMOL/L — SIGNIFICANT CHANGE UP (ref 135–145)
WBC # BLD: 14.84 K/UL — HIGH (ref 3.8–10.5)
WBC # FLD AUTO: 14.84 K/UL — HIGH (ref 3.8–10.5)

## 2025-03-25 PROCEDURE — 71270 CT THORAX DX C-/C+: CPT | Mod: 26

## 2025-03-25 PROCEDURE — 99223 1ST HOSP IP/OBS HIGH 75: CPT

## 2025-03-25 PROCEDURE — 99232 SBSQ HOSP IP/OBS MODERATE 35: CPT

## 2025-03-25 RX ORDER — MONTELUKAST SODIUM 10 MG/1
10 TABLET ORAL DAILY
Refills: 0 | Status: DISCONTINUED | OUTPATIENT
Start: 2025-03-25 | End: 2025-03-27

## 2025-03-25 RX ORDER — HEPARIN SODIUM 1000 [USP'U]/ML
5000 INJECTION INTRAVENOUS; SUBCUTANEOUS EVERY 8 HOURS
Refills: 0 | Status: DISCONTINUED | OUTPATIENT
Start: 2025-03-25 | End: 2025-03-27

## 2025-03-25 RX ADMIN — CARVEDILOL 6.25 MILLIGRAM(S): 3.12 TABLET, FILM COATED ORAL at 05:20

## 2025-03-25 RX ADMIN — Medication 75 MILLILITER(S): at 23:44

## 2025-03-25 RX ADMIN — QUETIAPINE FUMARATE 25 MILLIGRAM(S): 25 TABLET ORAL at 22:13

## 2025-03-25 RX ADMIN — INSULIN LISPRO 4: 100 INJECTION, SOLUTION INTRAVENOUS; SUBCUTANEOUS at 17:11

## 2025-03-25 RX ADMIN — MONTELUKAST SODIUM 10 MILLIGRAM(S): 10 TABLET ORAL at 11:14

## 2025-03-25 RX ADMIN — HEPARIN SODIUM 5000 UNIT(S): 1000 INJECTION INTRAVENOUS; SUBCUTANEOUS at 22:13

## 2025-03-25 RX ADMIN — CARVEDILOL 6.25 MILLIGRAM(S): 3.12 TABLET, FILM COATED ORAL at 17:13

## 2025-03-25 RX ADMIN — IPRATROPIUM BROMIDE AND ALBUTEROL SULFATE 3 MILLILITER(S): .5; 2.5 SOLUTION RESPIRATORY (INHALATION) at 05:19

## 2025-03-25 RX ADMIN — Medication 1 DOSE(S): at 17:12

## 2025-03-25 RX ADMIN — INSULIN LISPRO 27 UNIT(S): 100 INJECTION, SOLUTION INTRAVENOUS; SUBCUTANEOUS at 11:11

## 2025-03-25 RX ADMIN — INSULIN GLARGINE-YFGN 35 UNIT(S): 100 INJECTION, SOLUTION SUBCUTANEOUS at 08:46

## 2025-03-25 RX ADMIN — FUROSEMIDE 40 MILLIGRAM(S): 10 INJECTION INTRAMUSCULAR; INTRAVENOUS at 05:20

## 2025-03-25 RX ADMIN — INSULIN LISPRO 4: 100 INJECTION, SOLUTION INTRAVENOUS; SUBCUTANEOUS at 08:15

## 2025-03-25 RX ADMIN — ATORVASTATIN CALCIUM 80 MILLIGRAM(S): 80 TABLET, FILM COATED ORAL at 22:13

## 2025-03-25 RX ADMIN — IPRATROPIUM BROMIDE AND ALBUTEROL SULFATE 3 MILLILITER(S): .5; 2.5 SOLUTION RESPIRATORY (INHALATION) at 17:00

## 2025-03-25 RX ADMIN — IPRATROPIUM BROMIDE AND ALBUTEROL SULFATE 3 MILLILITER(S): .5; 2.5 SOLUTION RESPIRATORY (INHALATION) at 11:26

## 2025-03-25 RX ADMIN — Medication 75 MICROGRAM(S): at 05:20

## 2025-03-25 RX ADMIN — METHYLPREDNISOLONE ACETATE 40 MILLIGRAM(S): 80 INJECTION, SUSPENSION INTRA-ARTICULAR; INTRALESIONAL; INTRAMUSCULAR; SOFT TISSUE at 05:21

## 2025-03-25 RX ADMIN — INSULIN LISPRO 27 UNIT(S): 100 INJECTION, SOLUTION INTRAVENOUS; SUBCUTANEOUS at 17:11

## 2025-03-25 RX ADMIN — LOSARTAN POTASSIUM 50 MILLIGRAM(S): 100 TABLET, FILM COATED ORAL at 05:20

## 2025-03-25 RX ADMIN — INSULIN LISPRO 27 UNIT(S): 100 INJECTION, SOLUTION INTRAVENOUS; SUBCUTANEOUS at 08:15

## 2025-03-25 RX ADMIN — INSULIN LISPRO 10: 100 INJECTION, SOLUTION INTRAVENOUS; SUBCUTANEOUS at 11:10

## 2025-03-25 RX ADMIN — HEPARIN SODIUM 5000 UNIT(S): 1000 INJECTION INTRAVENOUS; SUBCUTANEOUS at 13:08

## 2025-03-25 NOTE — PHYSICAL THERAPY INITIAL EVALUATION ADULT - PERTINENT HX OF CURRENT PROBLEM, REHAB EVAL
79 F pmh HTN, DM, asthma, lung cancer, presents today for cough and shortness of breath, Admitted for acute asthma exacerbation 02/02 RSV infection

## 2025-03-25 NOTE — CONSULT NOTE ADULT - ASSESSMENT
79 F pmh HTN, DM, asthma, lung cancer, presents to the hospital for cough and shortness of breath. Found to have RSV positive. Started on steroids and Nebs with some improvment.   She has had asthm for the last 25 years. But is only on albuterol nebs? Does not know her doctors.    Attmpting to reach : Dr. Teofilo Haider ([facility]) was a former resident here, [place] [facility] is as usual refusing to forward any information or connect me to a provider...., called her pharamcy to obtain provider numbers but still waiting for a call back 2 hours later.    Given progressive hard to control asthma that started in late adult ballard, and pet CT in our system postive for somatostatin tumor receptors possible DIPNECH though would like to obtain records or talk to her providers rather than start from scratch.   - other wise agree with current managment of steroids, Duoneb and advair  though would increas advair dosing to high dose.   -

## 2025-03-25 NOTE — PHYSICAL THERAPY INITIAL EVALUATION ADULT - GENERAL OBSERVATIONS, REHAB EVAL
Pt found semisupine in bed A&Ox4, cooperative. +telemetry +primafit + IV  used ( [facility] # [id number]). Pt contacted juan Mendoza on Facetime to provide history. SpO2 92-94% RA however complains of SOB during transfers/ambulation. Frequent rest needed and deep breathing instructed.

## 2025-03-25 NOTE — PROGRESS NOTE ADULT - SUBJECTIVE AND OBJECTIVE BOX
Patient is a 79y old  Female who presents with a chief complaint of Asthma exacerbation 2/2 RSV infection (24 Mar 2025 12:49)    INTERVAL HPI/OVERNIGHT EVENTS: no events     MEDICATIONS  (STANDING):  albuterol/ipratropium for Nebulization 3 milliLiter(s) Nebulizer every 6 hours  atorvastatin 80 milliGRAM(s) Oral at bedtime  carvedilol 6.25 milliGRAM(s) Oral every 12 hours  dextrose 5%. 1000 milliLiter(s) (50 mL/Hr) IV Continuous <Continuous>  dextrose 50% Injectable 25 Gram(s) IV Push once  furosemide    Tablet 40 milliGRAM(s) Oral daily  glucagon  Injectable 1 milliGRAM(s) IntraMuscular once  insulin glargine Injectable (LANTUS) 35 Unit(s) SubCutaneous every morning  insulin lispro (ADMELOG) corrective regimen sliding scale   SubCutaneous at bedtime  insulin lispro (ADMELOG) corrective regimen sliding scale   SubCutaneous three times a day before meals  insulin lispro Injectable (ADMELOG) 27 Unit(s) SubCutaneous three times a day before meals  levothyroxine 75 MICROGram(s) Oral daily  losartan 50 milliGRAM(s) Oral daily  methylPREDNISolone sodium succinate Injectable 40 milliGRAM(s) IV Push every 24 hours  QUEtiapine 25 milliGRAM(s) Oral at bedtime  sodium chloride 0.9%. 1000 milliLiter(s) (75 mL/Hr) IV Continuous <Continuous>    MEDICATIONS  (PRN):  acetaminophen     Tablet .. 650 milliGRAM(s) Oral every 6 hours PRN Temp greater or equal to 38C (100.4F), Mild Pain (1 - 3)  aluminum hydroxide/magnesium hydroxide/simethicone Suspension 30 milliLiter(s) Oral every 4 hours PRN Dyspepsia  dextrose Oral Gel 15 Gram(s) Oral once PRN Blood Glucose LESS THAN 70 milliGRAM(s)/deciliter  melatonin 3 milliGRAM(s) Oral at bedtime PRN Insomnia  ondansetron Injectable 4 milliGRAM(s) IV Push every 8 hours PRN Nausea and/or Vomiting    Allergies    No Known Allergies    Intolerances      REVIEW OF SYSTEMS:  All other systems reviewed and are negative    Vital Signs Last 24 Hrs  T(C): 36.4 (25 Mar 2025 04:54), Max: 36.4 (24 Mar 2025 16:39)  T(F): 97.5 (25 Mar 2025 04:54), Max: 97.6 (24 Mar 2025 16:39)  HR: 65 (25 Mar 2025 07:05) (54 - 84)  BP: 121/72 (25 Mar 2025 04:54) (121/72 - 138/75)  BP(mean): --  RR: 18 (25 Mar 2025 04:54) (18 - 18)  SpO2: 96% (25 Mar 2025 05:20) (92% - 99%)    Parameters below as of 24 Mar 2025 17:25  Patient On (Oxygen Delivery Method): room air      Daily     Daily Weight in k.2 (25 Mar 2025 04:54)  I&O's Summary    24 Mar 2025 07:01  -  25 Mar 2025 07:00  --------------------------------------------------------  IN: 1460 mL / OUT: 1600 mL / NET: -140 mL      CAPILLARY BLOOD GLUCOSE      POCT Blood Glucose.: 205 mg/dL (25 Mar 2025 07:38)  POCT Blood Glucose.: 248 mg/dL (24 Mar 2025 21:15)  POCT Blood Glucose.: 248 mg/dL (24 Mar 2025 17:30)  POCT Blood Glucose.: 328 mg/dL (24 Mar 2025 11:01)    PHYSICAL EXAM:  GENERAL: NAD,    HEAD:  Atraumatic, Normocephalic  EYES: EOMI, PERRLA, conjunctiva and sclera clear  ENMT: No tonsillar erythema, exudates, or enlargement; Moist mucous membranes, Good dentition, No lesions  NECK: Supple, No JVD, Normal thyroid  NERVOUS SYSTEM:  Alert & Oriented X3, Good concentration; Motor Strength 5/5 B/L upper and lower extremities; DTRs 2+ intact and symmetric  CHEST/LUNG: b.l wheezing   HEART: Regular rate and rhythm; No murmurs, rubs, or gallops  ABDOMEN: Soft, Nontender, Nondistended; Bowel sounds present  EXTREMITIES:  2+ Peripheral Pulses, No clubbing, cyanosis, or edema  LYMPH: No lymphadenopathy noted  SKIN: No rashes or lesions    Labs                          12.0   14.84 )-----------( 269      ( 25 Mar 2025 07:00 )             37.4     03-25    139  |  109[H]  |  61[H]  ----------------------------<  213[H]  4.9   |  26  |  1.55[H]    Ca    8.4[L]      25 Mar 2025 07:00    TPro  6.4  /  Alb  2.7[L]  /  TBili  0.3  /  DBili  x   /  AST  16  /  ALT  18  /  AlkPhos  105  03-24          Urinalysis Basic - ( 25 Mar 2025 07:00 )    Color: x / Appearance: x / SG: x / pH: x  Gluc: 213 mg/dL / Ketone: x  / Bili: x / Urobili: x   Blood: x / Protein: x / Nitrite: x   Leuk Esterase: x / RBC: x / WBC x   Sq Epi: x / Non Sq Epi: x / Bacteria: x                  DVT prophylaxis: > Lovenox 40mg SQ daily  > Heparin   > SCD's4

## 2025-03-25 NOTE — PHYSICAL THERAPY INITIAL EVALUATION ADULT - ADDITIONAL COMMENTS
Pt lives with son and daughter in law on the 1st floor of a 2 family house. There are 4-5 steps B HR to enter and family assists pt during stair climbing. Pt mostly a household ambulator ambulating indep with her rollator. Assist needed with ADLS.

## 2025-03-25 NOTE — CONSULT NOTE ADULT - SUBJECTIVE AND OBJECTIVE BOX
CHIEF COMPLAINT:    HPI: 79 F pmh HTN, DM, asthma, lung cancer, presents to the hospital for cough and shortness of breath. Found to have RSV positive. Started on steroids and Nebs with some improvment.       PAST MEDICAL & SURGICAL HISTORY:  Asthma      H/O: HTN (hypertension)      Hypothyroid      HLD (hyperlipidemia)      Diabetes mellitus      CVA (cerebrovascular accident)          FAMILY HISTORY:      SOCIAL HISTORY:  Smoking: [ ] Never Smoked [ ] Former Smoker (__ packs x ___ years) [ ] Current Smoker  (__ packs x ___ years)  Substance Use: [ ] Never Used [ ] Used ____  EtOH Use:  Marital Status: [ ] Single [ ]  [ ]  [ ]   Sexual History:   Occupation:  Recent Travel:  Country of Birth:  Advance Directives:    Allergies    No Known Allergies    Intolerances        HOME MEDICATIONS:    REVIEW OF SYSTEMS:  Constitutional: [ ] negative [ ] fevers [ ] chills [ ] weight loss [ ] weight gain  HEENT: [ ] negative [ ] dry eyes [ ] eye irritation [ ] postnasal drip [ ] nasal congestion  CV: [ ] negative  [ ] chest pain [ ] orthopnea [ ] palpitations [ ] murmur  Resp: [ ] negative [ ] cough [ ] shortness of breath [ ] dyspnea [ ] wheezing [ ] sputum [ ] hemoptysis  GI: [ ] negative [ ] nausea [ ] vomiting [ ] diarrhea [ ] constipation [ ] abd pain [ ] dysphagia   : [ ] negative [ ] dysuria [ ] nocturia [ ] hematuria [ ] increased urinary frequency  Musculoskeletal: [ ] negative [ ] back pain [ ] myalgias [ ] arthralgias [ ] fracture  Skin: [ ] negative [ ] rash [ ] itch  Neurological: [ ] negative [ ] headache [ ] dizziness [ ] syncope [ ] weakness [ ] numbness  Psychiatric: [ ] negative [ ] anxiety [ ] depression  Endocrine: [ ] negative [ ] diabetes [ ] thyroid problem  Hematologic/Lymphatic: [ ] negative [ ] anemia [ ] bleeding problem  Allergic/Immunologic: [ ] negative [ ] itchy eyes [ ] nasal discharge [ ] hives [ ] angioedema  [ ] All other systems negative  [ ] Unable to assess ROS because ________    OBJECTIVE:  ICU Vital Signs Last 24 Hrs  T(C): 37.1 (25 Mar 2025 10:44), Max: 37.1 (25 Mar 2025 10:44)  T(F): 98.8 (25 Mar 2025 10:44), Max: 98.8 (25 Mar 2025 10:44)  HR: 77 (25 Mar 2025 10:44) (65 - 84)  BP: 132/70 (25 Mar 2025 10:44) (121/72 - 145/57)  BP(mean): --  ABP: --  ABP(mean): --  RR: 17 (25 Mar 2025 10:44) (17 - 18)  SpO2: 93% (25 Mar 2025 10:44) (92% - 99%)    O2 Parameters below as of 25 Mar 2025 09:48  Patient On (Oxygen Delivery Method): room air              03-24 @ 07:01  -  03-25 @ 07:00  --------------------------------------------------------  IN: 1460 mL / OUT: 1600 mL / NET: -140 mL    03-25 @ 07:01  -  03-25 @ 13:12  --------------------------------------------------------  IN: 320 mL / OUT: 1000 mL / NET: -680 mL      CAPILLARY BLOOD GLUCOSE      POCT Blood Glucose.: 369 mg/dL (25 Mar 2025 10:52)      PHYSICAL EXAM:  General:   HEENT:   Lymph Nodes:  Neck:   Respiratory:   Cardiovascular:   Abdomen:   Extremities:   Skin:   Neurological:  Psychiatry:    HOSPITAL MEDICATIONS:  Standing Meds:  albuterol/ipratropium for Nebulization 3 milliLiter(s) Nebulizer every 6 hours  atorvastatin 80 milliGRAM(s) Oral at bedtime  carvedilol 6.25 milliGRAM(s) Oral every 12 hours  dextrose 5%. 1000 milliLiter(s) IV Continuous <Continuous>  dextrose 50% Injectable 25 Gram(s) IV Push once  fluticasone propionate/ salmeterol 100-50 MICROgram(s) Diskus 1 Dose(s) Inhalation two times a day  furosemide    Tablet 40 milliGRAM(s) Oral daily  glucagon  Injectable 1 milliGRAM(s) IntraMuscular once  heparin   Injectable 5000 Unit(s) SubCutaneous every 8 hours  insulin glargine Injectable (LANTUS) 35 Unit(s) SubCutaneous every morning  insulin lispro (ADMELOG) corrective regimen sliding scale   SubCutaneous at bedtime  insulin lispro (ADMELOG) corrective regimen sliding scale   SubCutaneous three times a day before meals  insulin lispro Injectable (ADMELOG) 27 Unit(s) SubCutaneous three times a day before meals  levothyroxine 75 MICROGram(s) Oral daily  losartan 50 milliGRAM(s) Oral daily  methylPREDNISolone sodium succinate Injectable 40 milliGRAM(s) IV Push every 24 hours  montelukast 10 milliGRAM(s) Oral daily  QUEtiapine 25 milliGRAM(s) Oral at bedtime  sodium chloride 0.9%. 1000 milliLiter(s) IV Continuous <Continuous>      PRN Meds:  acetaminophen     Tablet .. 650 milliGRAM(s) Oral every 6 hours PRN  aluminum hydroxide/magnesium hydroxide/simethicone Suspension 30 milliLiter(s) Oral every 4 hours PRN  dextrose Oral Gel 15 Gram(s) Oral once PRN  melatonin 3 milliGRAM(s) Oral at bedtime PRN  ondansetron Injectable 4 milliGRAM(s) IV Push every 8 hours PRN      LABS:                        12.0   14.84 )-----------( 269      ( 25 Mar 2025 07:00 )             37.4     Hgb Trend: 12.0<--, 11.9<--, 11.7<--, 12.0<--  03-25    139  |  109[H]  |  61[H]  ----------------------------<  213[H]  4.9   |  26  |  1.55[H]    Ca    8.4[L]      25 Mar 2025 07:00    TPro  6.4  /  Alb  2.7[L]  /  TBili  0.3  /  DBili  x   /  AST  16  /  ALT  18  /  AlkPhos  105  03-24    Creatinine Trend: 1.55<--, 1.78<--, 2.23<--, 1.84<--    Urinalysis Basic - ( 25 Mar 2025 07:00 )    Color: x / Appearance: x / SG: x / pH: x  Gluc: 213 mg/dL / Ketone: x  / Bili: x / Urobili: x   Blood: x / Protein: x / Nitrite: x   Leuk Esterase: x / RBC: x / WBC x   Sq Epi: x / Non Sq Epi: x / Bacteria: x            MICROBIOLOGY:     RADIOLOGY:  [ ] Reviewed and interpreted by me    PULMONARY FUNCTION TESTS:    EKG: CHIEF COMPLAINT:    HPI: 79 F pmh HTN, DM, asthma, lung cancer, presents to the hospital for cough and shortness of breath. Found to have RSV positive. Started on steroids and Nebs with some improvment.   She has had asthm for the last 25 years. But is only on albuterol nebs? Does not know her doctors.    [place] [facility] [place] a holes/cysts in her lung.   Not a smoker. got suddenly worse last week. her sone has the same symptoms.    States she had a CT this week.   But does not know results.   Cough is some times productive, not bloody but worse with inspiration.  Mucus is clear. fevers resolved.   cant really catch her breath.  She has been to the ER almost 9 times this year, and been on multiple courses of steroids and abx.     PAST MEDICAL & SURGICAL HISTORY:  Asthma      H/O: HTN (hypertension)      Hypothyroid      HLD (hyperlipidemia)      Diabetes mellitus      CVA (cerebrovascular accident)          FAMILY HISTORY:      SOCIAL HISTORY:  Smoking: [ ] Never Smoked [ ] Former Smoker (__ packs x ___ years) [ ] Current Smoker  (__ packs x ___ years)  Substance Use: [ ] Never Used [ ] Used ____  EtOH Use:  Marital Status: [ ] Single [ ]  [ ]  [ ]   Sexual History:   Occupation:  Recent Travel:  Country of Birth:  Advance Directives:    Allergies    No Known Allergies    Intolerances        HOME MEDICATIONS:    REVIEW OF SYSTEMS:    [ ] Unable to assess ROS because ________    OBJECTIVE:  ICU Vital Signs Last 24 Hrs  T(C): 37.1 (25 Mar 2025 10:44), Max: 37.1 (25 Mar 2025 10:44)  T(F): 98.8 (25 Mar 2025 10:44), Max: 98.8 (25 Mar 2025 10:44)  HR: 77 (25 Mar 2025 10:44) (65 - 84)  BP: 132/70 (25 Mar 2025 10:44) (121/72 - 145/57)  BP(mean): --  ABP: --  ABP(mean): --  RR: 17 (25 Mar 2025 10:44) (17 - 18)  SpO2: 93% (25 Mar 2025 10:44) (92% - 99%)    O2 Parameters below as of 25 Mar 2025 09:48  Patient On (Oxygen Delivery Method): room air              03-24 @ 07:01 - 03-25 @ 07:00  --------------------------------------------------------  IN: 1460 mL / OUT: 1600 mL / NET: -140 mL    03-25 @ 07:01 - 03-25 @ 13:12  --------------------------------------------------------  IN: 320 mL / OUT: 1000 mL / NET: -680 mL      CAPILLARY BLOOD GLUCOSE      POCT Blood Glucose.: 369 mg/dL (25 Mar 2025 10:52)      PHYSICAL EXAM:  General:  Comfrotable at rest with Mild exertional respiratory distress.   HEENT: MMM:  Neck: No JVD  Respiratory: Diffuse wheeze, and some inspiratory squeecks  Cardiovascular: S1 S2 RRR  Abdomen: Soft, obese  Extremities: minimal dependent edema, warm and well perfused  Skin: Dry,   Neurological:Aox3  Psychiatry: appropriate mood and affect.     HOSPITAL MEDICATIONS:  Standing Meds:  albuterol/ipratropium for Nebulization 3 milliLiter(s) Nebulizer every 6 hours  atorvastatin 80 milliGRAM(s) Oral at bedtime  carvedilol 6.25 milliGRAM(s) Oral every 12 hours  dextrose 5%. 1000 milliLiter(s) IV Continuous <Continuous>  dextrose 50% Injectable 25 Gram(s) IV Push once  fluticasone propionate/ salmeterol 100-50 MICROgram(s) Diskus 1 Dose(s) Inhalation two times a day  furosemide    Tablet 40 milliGRAM(s) Oral daily  glucagon  Injectable 1 milliGRAM(s) IntraMuscular once  heparin   Injectable 5000 Unit(s) SubCutaneous every 8 hours  insulin glargine Injectable (LANTUS) 35 Unit(s) SubCutaneous every morning  insulin lispro (ADMELOG) corrective regimen sliding scale   SubCutaneous at bedtime  insulin lispro (ADMELOG) corrective regimen sliding scale   SubCutaneous three times a day before meals  insulin lispro Injectable (ADMELOG) 27 Unit(s) SubCutaneous three times a day before meals  levothyroxine 75 MICROGram(s) Oral daily  losartan 50 milliGRAM(s) Oral daily  methylPREDNISolone sodium succinate Injectable 40 milliGRAM(s) IV Push every 24 hours  montelukast 10 milliGRAM(s) Oral daily  QUEtiapine 25 milliGRAM(s) Oral at bedtime  sodium chloride 0.9%. 1000 milliLiter(s) IV Continuous <Continuous>      PRN Meds:  acetaminophen     Tablet .. 650 milliGRAM(s) Oral every 6 hours PRN  aluminum hydroxide/magnesium hydroxide/simethicone Suspension 30 milliLiter(s) Oral every 4 hours PRN  dextrose Oral Gel 15 Gram(s) Oral once PRN  melatonin 3 milliGRAM(s) Oral at bedtime PRN  ondansetron Injectable 4 milliGRAM(s) IV Push every 8 hours PRN      LABS:                        12.0   14.84 )-----------( 269      ( 25 Mar 2025 07:00 )             37.4     Hgb Trend: 12.0<--, 11.9<--, 11.7<--, 12.0<--  03-25    139  |  109[H]  |  61[H]  ----------------------------<  213[H]  4.9   |  26  |  1.55[H]    Ca    8.4[L]      25 Mar 2025 07:00    TPro  6.4  /  Alb  2.7[L]  /  TBili  0.3  /  DBili  x   /  AST  16  /  ALT  18  /  AlkPhos  105  03-24    Creatinine Trend: 1.55<--, 1.78<--, 2.23<--, 1.84<--    Urinalysis Basic - ( 25 Mar 2025 07:00 )    Color: x / Appearance: x / SG: x / pH: x  Gluc: 213 mg/dL / Ketone: x  / Bili: x / Urobili: x   Blood: x / Protein: x / Nitrite: x   Leuk Esterase: x / RBC: x / WBC x   Sq Epi: x / Non Sq Epi: x / Bacteria: x            MICROBIOLOGY:     RADIOLOGY:  [ ] Reviewed and interpreted by me    PULMONARY FUNCTION TESTS:    EKG:

## 2025-03-25 NOTE — PROGRESS NOTE ADULT - ASSESSMENT
79 F pmh HTN, DM, asthma, lung cancer, presents today for cough and shortness of breath, Admitted for acute asthma exacerbation 02/02 RSV infection     Acute asthma exacerbation to RSV infection   The patient presents with cough and shortness of breath x3 days. RSV positive.   - Solu-Medrol    - DuoNebs , singulair, advair   -still with significant wheezing and cough, will have pulmonary evaluate     Diabetes mellitus with Hyperglycemia , steroid effect   - resume home lantus/humalog dose   -monitor finger sticks closely today     Hyperkalemia/hyponatremia   -likely 2/2 hyperglycemia  -lokelma     WILBER  -will give gentle hydration     HTN/HLD   -resume home meds     Code status: DNR/DNI

## 2025-03-25 NOTE — PHYSICAL THERAPY INITIAL EVALUATION ADULT - GAIT TRAINING, PT EVAL
Shagufta Barone is a 44 year old female patient.  No diagnosis found.  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 rate 16, last menstrual period 08/19/2020, SpO2 98 %.    Procedures   This is Dr. Ayala dictating a note on Shagufta Barone    Chief complaint:  Low back pain with right leg pain.    Preop diagnosis:  Low back pain with right leg radicular pain due to disc disease L5-S1    Postop diagnosis:  Same    Procedure:  Transforaminal lumbar epidural steroid injection right L5-S1 with fluoroscopy      The patient is a 44 year old female who is referred by Dr. Mendez for epidural steroid injection. The patient has had a multiyear history of back and leg pain.  She had prior epidurals in [place] in 2017.  She presents now with a 2 to three-month history of significantly worsening pain in her low back to her right buttock and posteriorly down the right leg to her knee  shehas been taking the following medications:       Outpatient Medications Marked as Taking for the 9/17/20 encounter (Hospital Encounter)   Medication Sig Dispense Refill   • Multiple Minerals (CALCIUM/MAGNESIUM/ZINC PO)      • POTASSIUM CHLORIDE PO      • venlafaxine XR (EFFEXOR XR) 75 MG 24 hr capsule Take 1 capsule by  mouth daily. 30 capsule 0        Past medical history is significant for:  Patient Active Problem List   Diagnosis   • History of tobacco use   • Somatic dysfunction   • Joint pain   • Infertility, female   • Arthritis   • Anxiety   • Lumbar radiculopathy       MRI scan shows from 2017 she has lateral recess stenosis on the right side at L5-S1.     Risks and benefits of epidural steroid injection were discussed including risks of headache, bleeding, nerve injury and infection.    Patient was taken to the operating room and positioned prone on the fluoroscopy table. Fluoroscopy was used to identify the right transforaminal L5-S1 interspace. The skin was prepped with Hibiclens and draped in a sterile manner. The skin was infiltrated with 1% lidocaine. A 20-gauge Toughy needle was used to enter the epidural space by loss-of-resistance technique. This was accomplished easily at 8 cm depth. Isovue 3 cc were injected and demonstrated good epidural spread from the nerve proximally into the foramina and up to L4. Depo-Medrol 80 mg +5 cc of saline  was administered   The patient tolerated the procedure well and was taken back to outpatient surgery in stable condition. We will see the patient again in 3 weeks if needed.A total of 8 seconds of flouro was used.    Jus Ayala MD  9/17/2020   Pt will be able to ambulate independently using assistive device up to 100 ft or more, be able to negotiate steps safely observing proper gait, posture and prevent falls.

## 2025-03-25 NOTE — CONSULT NOTE ADULT - REASON FOR ADMISSION
Asthma exacerbation 2/2 RSV infection I spent 70 minutes in patient encounter, greater than 50% of the time was spent in counseling/coordination of care.

## 2025-03-25 NOTE — PHYSICAL THERAPY INITIAL EVALUATION ADULT - TRANSFER SAFETY CONCERNS NOTED: BED/CHAIR, REHAB EVAL
V2.0  Discharge Summary    Name:  Harsha Liu /Age/Sex: 1972 (52 y.o. male)   Admit Date: 2024  Discharge Date: 24    MRN & CSN:  [id number] & [id number] Encounter Date and Time 24 7:17 PM EDT    Attending:  No att. providers found Discharging Provider: Rea Anderson MD       Hospital Course:     Brief HPI: Harsha Liu is a 52 y.o. male who presented with alcohol withdrawal.    Brief Problem Based Course:     Acute Alcohol Withdrawal: S/p phenobarbital taper, thiamine, supportive care with improvement.  CM provided rehab resources.  Patient thoroughly counseled on cessation and very motivated to quit, did inquire about monthly Vivitrol shots.  Follow-up with PCP and rehab.    Alcoholic Cirrhosis with Varices: LFT's improving, patient instructed to hold statin for a few more days, repeat LFTs outpatient and follow-up with PCP.      The patient expressed appropriate understanding of, and agreement with the discharge recommendations, medications, and plan.     Consults this admission:  IP CONSULT TO SOCIAL WORK  IP CONSULT TO CASE MANAGEMENT  IP CONSULT TO SOCIAL WORK    Discharge Diagnosis:   Alcohol withdrawal syndrome, uncomplicated (HCC)    Discharge Instruction:   Follow up appointments: PCP, alcohol rehab  Primary care physician: John Blue MD within 1 week  Diet: Low-salt  Activity: activity as tolerated  Disposition: Discharged to:   [x]Home, [][facility], []SNF, []Acute Rehab, []Hospice   Condition on discharge: Stable  Labs and Tests to be Followed up as an outpatient by PCP or Specialist: LFTs    Discharge Medications:        Medication List        CONTINUE taking these medications      acamprosate 333 MG tablet  Commonly known as: CAMPRAL  take 1 tablet THREE TIMES DAILY with food     atorvastatin 40 MG tablet  Commonly known as: LIPITOR  Take 1 tablet by mouth nightly     folic acid 400 MCG tablet  Commonly known as: FOLVITE     nadolol 20 MG tablet  Commonly known 
decreased step length

## 2025-03-26 LAB
ALBUMIN SERPL ELPH-MCNC: 2.5 G/DL — LOW (ref 3.3–5)
ALP SERPL-CCNC: 101 U/L — SIGNIFICANT CHANGE UP (ref 40–120)
ALT FLD-CCNC: 24 U/L — SIGNIFICANT CHANGE UP (ref 12–78)
ANION GAP SERPL CALC-SCNC: 5 MMOL/L — SIGNIFICANT CHANGE UP (ref 5–17)
AST SERPL-CCNC: 20 U/L — SIGNIFICANT CHANGE UP (ref 15–37)
BILIRUB SERPL-MCNC: 0.3 MG/DL — SIGNIFICANT CHANGE UP (ref 0.2–1.2)
BUN SERPL-MCNC: 64 MG/DL — HIGH (ref 7–23)
CALCIUM SERPL-MCNC: 8.4 MG/DL — LOW (ref 8.5–10.1)
CHLORIDE SERPL-SCNC: 105 MMOL/L — SIGNIFICANT CHANGE UP (ref 96–108)
CO2 SERPL-SCNC: 25 MMOL/L — SIGNIFICANT CHANGE UP (ref 22–31)
CREAT SERPL-MCNC: 1.74 MG/DL — HIGH (ref 0.5–1.3)
EGFR: 29 ML/MIN/1.73M2 — LOW
EGFR: 29 ML/MIN/1.73M2 — LOW
GLUCOSE BLDC GLUCOMTR-MCNC: 229 MG/DL — HIGH (ref 70–99)
GLUCOSE BLDC GLUCOMTR-MCNC: 252 MG/DL — HIGH (ref 70–99)
GLUCOSE BLDC GLUCOMTR-MCNC: 308 MG/DL — HIGH (ref 70–99)
GLUCOSE BLDC GLUCOMTR-MCNC: 363 MG/DL — HIGH (ref 70–99)
GLUCOSE SERPL-MCNC: 405 MG/DL — HIGH (ref 70–99)
HCT VFR BLD CALC: 40.9 % — SIGNIFICANT CHANGE UP (ref 34.5–45)
HGB BLD-MCNC: 13.2 G/DL — SIGNIFICANT CHANGE UP (ref 11.5–15.5)
MAGNESIUM SERPL-MCNC: 1.8 MG/DL — SIGNIFICANT CHANGE UP (ref 1.6–2.6)
MCHC RBC-ENTMCNC: 27.3 PG — SIGNIFICANT CHANGE UP (ref 27–34)
MCHC RBC-ENTMCNC: 32.3 G/DL — SIGNIFICANT CHANGE UP (ref 32–36)
MCV RBC AUTO: 84.7 FL — SIGNIFICANT CHANGE UP (ref 80–100)
NRBC BLD AUTO-RTO: 0 /100 WBCS — SIGNIFICANT CHANGE UP (ref 0–0)
PHOSPHATE SERPL-MCNC: 2.9 MG/DL — SIGNIFICANT CHANGE UP (ref 2.5–4.5)
PLATELET # BLD AUTO: 295 K/UL — SIGNIFICANT CHANGE UP (ref 150–400)
POTASSIUM SERPL-MCNC: 4.6 MMOL/L — SIGNIFICANT CHANGE UP (ref 3.5–5.3)
POTASSIUM SERPL-SCNC: 4.6 MMOL/L — SIGNIFICANT CHANGE UP (ref 3.5–5.3)
PROT SERPL-MCNC: 6.3 GM/DL — SIGNIFICANT CHANGE UP (ref 6–8.3)
RBC # BLD: 4.83 M/UL — SIGNIFICANT CHANGE UP (ref 3.8–5.2)
RBC # FLD: 15.1 % — HIGH (ref 10.3–14.5)
SODIUM SERPL-SCNC: 135 MMOL/L — SIGNIFICANT CHANGE UP (ref 135–145)
WBC # BLD: 13.33 K/UL — HIGH (ref 3.8–10.5)
WBC # FLD AUTO: 13.33 K/UL — HIGH (ref 3.8–10.5)

## 2025-03-26 PROCEDURE — 99233 SBSQ HOSP IP/OBS HIGH 50: CPT

## 2025-03-26 PROCEDURE — 99232 SBSQ HOSP IP/OBS MODERATE 35: CPT

## 2025-03-26 RX ORDER — IPRATROPIUM BROMIDE AND ALBUTEROL SULFATE .5; 2.5 MG/3ML; MG/3ML
3 SOLUTION RESPIRATORY (INHALATION) EVERY 6 HOURS
Refills: 0 | Status: DISCONTINUED | OUTPATIENT
Start: 2025-03-26 | End: 2025-03-27

## 2025-03-26 RX ADMIN — FUROSEMIDE 40 MILLIGRAM(S): 10 INJECTION INTRAMUSCULAR; INTRAVENOUS at 05:20

## 2025-03-26 RX ADMIN — METHYLPREDNISOLONE ACETATE 40 MILLIGRAM(S): 80 INJECTION, SUSPENSION INTRA-ARTICULAR; INTRALESIONAL; INTRAMUSCULAR; SOFT TISSUE at 05:21

## 2025-03-26 RX ADMIN — HEPARIN SODIUM 5000 UNIT(S): 1000 INJECTION INTRAVENOUS; SUBCUTANEOUS at 05:21

## 2025-03-26 RX ADMIN — ATORVASTATIN CALCIUM 80 MILLIGRAM(S): 80 TABLET, FILM COATED ORAL at 21:16

## 2025-03-26 RX ADMIN — HEPARIN SODIUM 5000 UNIT(S): 1000 INJECTION INTRAVENOUS; SUBCUTANEOUS at 21:15

## 2025-03-26 RX ADMIN — INSULIN GLARGINE-YFGN 35 UNIT(S): 100 INJECTION, SOLUTION SUBCUTANEOUS at 09:26

## 2025-03-26 RX ADMIN — Medication 1 DOSE(S): at 18:10

## 2025-03-26 RX ADMIN — INSULIN LISPRO 8: 100 INJECTION, SOLUTION INTRAVENOUS; SUBCUTANEOUS at 16:50

## 2025-03-26 RX ADMIN — CARVEDILOL 6.25 MILLIGRAM(S): 3.12 TABLET, FILM COATED ORAL at 05:20

## 2025-03-26 RX ADMIN — IPRATROPIUM BROMIDE AND ALBUTEROL SULFATE 3 MILLILITER(S): .5; 2.5 SOLUTION RESPIRATORY (INHALATION) at 06:12

## 2025-03-26 RX ADMIN — Medication 1 DOSE(S): at 05:21

## 2025-03-26 RX ADMIN — INSULIN LISPRO 27 UNIT(S): 100 INJECTION, SOLUTION INTRAVENOUS; SUBCUTANEOUS at 12:25

## 2025-03-26 RX ADMIN — IPRATROPIUM BROMIDE AND ALBUTEROL SULFATE 3 MILLILITER(S): .5; 2.5 SOLUTION RESPIRATORY (INHALATION) at 00:05

## 2025-03-26 RX ADMIN — QUETIAPINE FUMARATE 25 MILLIGRAM(S): 25 TABLET ORAL at 21:16

## 2025-03-26 RX ADMIN — INSULIN LISPRO 27 UNIT(S): 100 INJECTION, SOLUTION INTRAVENOUS; SUBCUTANEOUS at 16:50

## 2025-03-26 RX ADMIN — LOSARTAN POTASSIUM 50 MILLIGRAM(S): 100 TABLET, FILM COATED ORAL at 05:21

## 2025-03-26 RX ADMIN — INSULIN LISPRO 4: 100 INJECTION, SOLUTION INTRAVENOUS; SUBCUTANEOUS at 09:26

## 2025-03-26 RX ADMIN — INSULIN LISPRO 1: 100 INJECTION, SOLUTION INTRAVENOUS; SUBCUTANEOUS at 21:17

## 2025-03-26 RX ADMIN — MONTELUKAST SODIUM 10 MILLIGRAM(S): 10 TABLET ORAL at 12:26

## 2025-03-26 RX ADMIN — HEPARIN SODIUM 5000 UNIT(S): 1000 INJECTION INTRAVENOUS; SUBCUTANEOUS at 14:05

## 2025-03-26 RX ADMIN — Medication 75 MICROGRAM(S): at 05:20

## 2025-03-26 RX ADMIN — INSULIN LISPRO 10: 100 INJECTION, SOLUTION INTRAVENOUS; SUBCUTANEOUS at 12:24

## 2025-03-26 RX ADMIN — INSULIN LISPRO 27 UNIT(S): 100 INJECTION, SOLUTION INTRAVENOUS; SUBCUTANEOUS at 09:27

## 2025-03-26 NOTE — PROGRESS NOTE ADULT - ASSESSMENT
79 F pmh HTN, DM, asthma, lung cancer, presents to the hospital for cough and shortness of breath admitted with asthma exacerbation 2/2 to RSV infection  RSV positive.      Recs:   - can continue duonebs/steroids   - continue advair   - history of lung cx - attempted to reach Dr. Teofilo Haider (heme Onc) yesterday but was unable to reach   - Given progressive hard to control asthma that started in late adult ballard, and pet CT in our system postive for somatostatin tumor receptors possible DIPNECH though would like to obtain records or talk to her providers rather than start from scratch.   - rest of care per primary team.     Discussed with Dr. Arellano.  79 F pmh HTN, DM, asthma, lung cancer, presents to the hospital for cough and shortness of breath admitted with asthma exacerbation 2/2 to RSV infection  RSV positive.      Recs:   - on RA with stable Spo2.   - can continue duonebs/steroids   - continue advair   - history of lung cx - possibly DIPNECH patient states that asthma/pulmonary nodules dx at same time ~20 years ago. Will follow up with Dr. Teofilo Haider (heme/onc) to discuss possibility of starting Octreotide to assess for symptomatic relief due to neuroendocrine tumors.  Progressive hard to control asthma that started in late adult ballard, and pet CT in our system postive for somatostatin tumor receptors.  - can trial CPAP at night for tracheomalacia for symptom control. If patient tolerates will need outpatient pulmonary follow up to arrange for CPAP at home.   - rest of care per primary team.   - spoke with patient using  #[id number]    Discussed with Dr. Arellano.  79 F pmh HTN, DM, asthma, lung cancer, presents to the hospital for cough and shortness of breath admitted with asthma exacerbation 2/2 to RSV infection  RSV positive.      Recs:   - on RA with stable Spo2.   - can continue duonebs/steroids   - increase advair to higher dose.   - history of lung cx - possibly DIPNECH patient states that asthma/pulmonary nodules dx at same time ~20 years ago. Will follow up with Dr. Teofilo Haider (heme/onc) to discuss possibility of starting Octreotide to assess for symptomatic relief due to neuroendocrine tumors.  Progressive hard to control asthma that started in late adult ballard, and pet CT in our system postive for somatostatin tumor receptors.  - can trial CPAP at night for tracheomalacia for symptom control. If patient tolerates will need outpatient pulmonary follow up to arrange for CPAP at home.   - rest of care per primary team.   - spoke with patient using  #[id number]    Discussed with Dr. Arellano.

## 2025-03-26 NOTE — PROGRESS NOTE ADULT - TIME BILLING
Lab test review, Radiology Review, Vitals review, Consultant review and discussion, Physical examination, IDR, Assessment and plan; Plan discussion with patient and family
Medical management as above, review of results/records, discussion with patient and primary team, documentation.

## 2025-03-26 NOTE — PROGRESS NOTE ADULT - SUBJECTIVE AND OBJECTIVE BOX
79 F w/ history of HTN, DM II, asthma, lung cancer who presented w/ cough and shortness of breath & was admitted for asthma exacerbation due to RSV infection. She is lying in bed in NAD.    MEDICATIONS  (STANDING):  atorvastatin 80 milliGRAM(s) Oral at bedtime  carvedilol 6.25 milliGRAM(s) Oral every 12 hours  dextrose 5%. 1000 milliLiter(s) (50 mL/Hr) IV Continuous <Continuous>  dextrose 50% Injectable 25 Gram(s) IV Push once  furosemide    Tablet 40 milliGRAM(s) Oral daily  glucagon  Injectable 1 milliGRAM(s) IntraMuscular once  heparin   Injectable 5000 Unit(s) SubCutaneous every 8 hours  insulin glargine Injectable (LANTUS) 35 Unit(s) SubCutaneous every morning  insulin lispro (ADMELOG) corrective regimen sliding scale   SubCutaneous three times a day before meals  insulin lispro (ADMELOG) corrective regimen sliding scale   SubCutaneous at bedtime  insulin lispro Injectable (ADMELOG) 27 Unit(s) SubCutaneous three times a day before meals  levothyroxine 75 MICROGram(s) Oral daily  losartan 50 milliGRAM(s) Oral daily  methylPREDNISolone sodium succinate Injectable 40 milliGRAM(s) IV Push every 24 hours  montelukast 10 milliGRAM(s) Oral daily  QUEtiapine 25 milliGRAM(s) Oral at bedtime  sodium chloride 0.9%. 1000 milliLiter(s) (75 mL/Hr) IV Continuous <Continuous>    MEDICATIONS  (PRN):  acetaminophen     Tablet .. 650 milliGRAM(s) Oral every 6 hours PRN Temp greater or equal to 38C (100.4F), Mild Pain (1 - 3)  albuterol/ipratropium for Nebulization 3 milliLiter(s) Nebulizer every 6 hours PRN Bronchospasm  aluminum hydroxide/magnesium hydroxide/simethicone Suspension 30 milliLiter(s) Oral every 4 hours PRN Dyspepsia  dextrose Oral Gel 15 Gram(s) Oral once PRN Blood Glucose LESS THAN 70 milliGRAM(s)/deciliter  melatonin 3 milliGRAM(s) Oral at bedtime PRN Insomnia  ondansetron Injectable 4 milliGRAM(s) IV Push every 8 hours PRN Nausea and/or Vomiting      Allergies    No Known Allergies    Intolerances        Vital Signs Last 24 Hrs  T(C): 36.2 (26 Mar 2025 04:42), Max: 36.7 (25 Mar 2025 16:24)  T(F): 97.2 (26 Mar 2025 04:42), Max: 98 (25 Mar 2025 16:24)  HR: 80 (26 Mar 2025 10:30) (69 - 86)  BP: 112/67 (26 Mar 2025 10:30) (112/67 - 144/61)  BP(mean): 96 (25 Mar 2025 16:24) (96 - 96)  RR: 18 (26 Mar 2025 04:42) (18 - 19)  SpO2: 95% (26 Mar 2025 10:30) (92% - 97%)    Parameters below as of 26 Mar 2025 13:21  Patient On (Oxygen Delivery Method): room air        PHYSICAL EXAM:  GENERAL: NAD   HEAD:  Atraumatic, Normocephalic  EYES: EOMI, PERRLA   NECK: Supple   NERVOUS SYSTEM:  Alert & Oriented X3, Good concentration   CHEST/LUNG: bilateral wheezing   HEART: Regular rate and rhythm; No murmurs, rubs, or gallops  ABDOMEN: Soft, Nontender, Nondistended; Bowel sounds present  EXTREMITIES: No clubbing, cyanosis, or edema     LABS:                        13.2   13.33 )-----------( 295      ( 26 Mar 2025 10:40 )             40.9     03-26    135  |  105  |  64[H]  ----------------------------<  405[H]  4.6   |  25  |  1.74[H]    Ca    8.4[L]      26 Mar 2025 10:40  Phos  2.9     03-26  Mg     1.8     03-26    TPro  6.3  /  Alb  2.5[L]  /  TBili  0.3  /  DBili  x   /  AST  20  /  ALT  24  /  AlkPhos  101  03-26      Urinalysis Basic - ( 26 Mar 2025 10:40 )    Color: x / Appearance: x / SG: x / pH: x  Gluc: 405 mg/dL / Ketone: x  / Bili: x / Urobili: x   Blood: x / Protein: x / Nitrite: x   Leuk Esterase: x / RBC: x / WBC x   Sq Epi: x / Non Sq Epi: x / Bacteria: x      CAPILLARY BLOOD GLUCOSE      POCT Blood Glucose.: 363 mg/dL (26 Mar 2025 11:35)  POCT Blood Glucose.: 229 mg/dL (26 Mar 2025 08:48)  POCT Blood Glucose.: 216 mg/dL (25 Mar 2025 22:08)  POCT Blood Glucose.: 248 mg/dL (25 Mar 2025 16:43)      RADIOLOGY & ADDITIONAL TESTS:    03-25-25 @ 07:01  -  03-26-25 @ 07:00  --------------------------------------------------------  IN:    Oral Fluid: 320 mL    sodium chloride 0.9%: 900 mL  Total IN: 1220 mL    OUT:    Voided (mL): 2250 mL  Total OUT: 2250 mL    Total NET: -1030 mL       79 F w/ history of HTN, DM II, asthma, lung cancer who presented w/ cough and shortness of breath & was admitted for asthma exacerbation due to RSV infection. She is lying in bed in NAD.    MEDICATIONS  (STANDING):  atorvastatin 80 milliGRAM(s) Oral at bedtime  carvedilol 6.25 milliGRAM(s) Oral every 12 hours  dextrose 5%. 1000 milliLiter(s) (50 mL/Hr) IV Continuous <Continuous>  dextrose 50% Injectable 25 Gram(s) IV Push once  furosemide    Tablet 40 milliGRAM(s) Oral daily  glucagon  Injectable 1 milliGRAM(s) IntraMuscular once  heparin   Injectable 5000 Unit(s) SubCutaneous every 8 hours  insulin glargine Injectable (LANTUS) 35 Unit(s) SubCutaneous every morning  insulin lispro (ADMELOG) corrective regimen sliding scale   SubCutaneous three times a day before meals  insulin lispro (ADMELOG) corrective regimen sliding scale   SubCutaneous at bedtime  insulin lispro Injectable (ADMELOG) 27 Unit(s) SubCutaneous three times a day before meals  levothyroxine 75 MICROGram(s) Oral daily  losartan 50 milliGRAM(s) Oral daily  methylPREDNISolone sodium succinate Injectable 40 milliGRAM(s) IV Push every 24 hours  montelukast 10 milliGRAM(s) Oral daily  QUEtiapine 25 milliGRAM(s) Oral at bedtime  sodium chloride 0.9%. 1000 milliLiter(s) (75 mL/Hr) IV Continuous <Continuous>    MEDICATIONS  (PRN):  acetaminophen     Tablet .. 650 milliGRAM(s) Oral every 6 hours PRN Temp greater or equal to 38C (100.4F), Mild Pain (1 - 3)  albuterol/ipratropium for Nebulization 3 milliLiter(s) Nebulizer every 6 hours PRN Bronchospasm  aluminum hydroxide/magnesium hydroxide/simethicone Suspension 30 milliLiter(s) Oral every 4 hours PRN Dyspepsia  dextrose Oral Gel 15 Gram(s) Oral once PRN Blood Glucose LESS THAN 70 milliGRAM(s)/deciliter  melatonin 3 milliGRAM(s) Oral at bedtime PRN Insomnia  ondansetron Injectable 4 milliGRAM(s) IV Push every 8 hours PRN Nausea and/or Vomiting      Allergies    No Known Allergies    Intolerances        Vital Signs Last 24 Hrs  T(C): 36.2 (26 Mar 2025 04:42), Max: 36.7 (25 Mar 2025 16:24)  T(F): 97.2 (26 Mar 2025 04:42), Max: 98 (25 Mar 2025 16:24)  HR: 80 (26 Mar 2025 10:30) (69 - 86)  BP: 112/67 (26 Mar 2025 10:30) (112/67 - 144/61)  BP(mean): 96 (25 Mar 2025 16:24) (96 - 96)  RR: 18 (26 Mar 2025 04:42) (18 - 19)  SpO2: 95% (26 Mar 2025 10:30) (92% - 97%)    Parameters below as of 26 Mar 2025 13:21  Patient On (Oxygen Delivery Method): room air        PHYSICAL EXAM:  GENERAL: NAD   HEAD:  Atraumatic, Normocephalic  EYES: EOMI, PERRLA   NECK: Supple   NERVOUS SYSTEM:  Alert & Oriented X3, Good concentration   CHEST/LUNG: bilateral wheezing   HEART: Regular rate and rhythm; No murmurs, rubs, or gallops  ABDOMEN: Soft, Nontender, Nondistended; Bowel sounds present  EXTREMITIES: bilateral LE +1 edema     LABS:                        13.2   13.33 )-----------( 295      ( 26 Mar 2025 10:40 )             40.9     03-26    135  |  105  |  64[H]  ----------------------------<  405[H]  4.6   |  25  |  1.74[H]    Ca    8.4[L]      26 Mar 2025 10:40  Phos  2.9     03-26  Mg     1.8     03-26    TPro  6.3  /  Alb  2.5[L]  /  TBili  0.3  /  DBili  x   /  AST  20  /  ALT  24  /  AlkPhos  101  03-26      Urinalysis Basic - ( 26 Mar 2025 10:40 )    Color: x / Appearance: x / SG: x / pH: x  Gluc: 405 mg/dL / Ketone: x  / Bili: x / Urobili: x   Blood: x / Protein: x / Nitrite: x   Leuk Esterase: x / RBC: x / WBC x   Sq Epi: x / Non Sq Epi: x / Bacteria: x      CAPILLARY BLOOD GLUCOSE      POCT Blood Glucose.: 363 mg/dL (26 Mar 2025 11:35)  POCT Blood Glucose.: 229 mg/dL (26 Mar 2025 08:48)  POCT Blood Glucose.: 216 mg/dL (25 Mar 2025 22:08)  POCT Blood Glucose.: 248 mg/dL (25 Mar 2025 16:43)      RADIOLOGY & ADDITIONAL TESTS:    03-25-25 @ 07:01  -  03-26-25 @ 07:00  --------------------------------------------------------  IN:    Oral Fluid: 320 mL    sodium chloride 0.9%: 900 mL  Total IN: 1220 mL    OUT:    Voided (mL): 2250 mL  Total OUT: 2250 mL    Total NET: -1030 mL

## 2025-03-26 NOTE — PROGRESS NOTE ADULT - ASSESSMENT
79 F w/ history of HTN, DM II, asthma, lung cancer who presented w/ cough and shortness of breath & was admitted for asthma exacerbation due to RSV infection.      Asthma exacerbation due to RSV infection   - CT chest showed numerous bilateral pulmonary nodules as noted on the PET/CT of March 20, 2025 - patient has a history of lung ca  - c/w Solu-Medrol    - c/w DuoNeb, Singulair & Advair    - pulmonary following     Diabetes mellitus with Hyperglycemia due to steroid    - c/w Lantus, mealtime humalog & ISS  - Hgb A1C is 9.3    WILBER  - resolved w/ IVF     HTN  - c/w Carvedilol & Losartan   - patient is also on Lasix    HLD   - c/w Lipitor     Hypothyroid  - c/w Synthroid     Prophylaxis:  DVT: Heparin  GI: PO diet    Code status: DNR/DNI    79 F w/ history of HTN, DM II, asthma, lung cancer who presented w/ cough and shortness of breath & was admitted for asthma exacerbation due to RSV infection.      Asthma exacerbation due to RSV infection   - CT chest showed numerous bilateral pulmonary nodules as noted on the PET/CT of March 20, 2025 - patient has a history of lung ca  - c/w Solu-Medrol    - c/w DuoNeb, Singulair & Advair    - pulmonary following and will start CPAP QHS to see if she has symptomatic relief for her tracheomalacia - may started a trail of Somatostatin analogues in discussion with her oncologist if she does not improve        Diabetes mellitus with Hyperglycemia due to steroid    - c/w Lantus, mealtime humalog & ISS  - Hgb A1C is 9.3    WILBER  - resolved w/ IVF     HTN  - c/w Carvedilol & Losartan   - patient is also on Lasix    HLD   - c/w Lipitor     Hypothyroid  - c/w Synthroid     Prophylaxis:  DVT: Heparin  GI: PO diet    Code status: DNR/DNI

## 2025-03-26 NOTE — PROGRESS NOTE ADULT - SUBJECTIVE AND OBJECTIVE BOX
Interval Events: NAEO. RA with stable Spo2.    REVIEW OF SYSTEMS:  Constitutional: [ ] negative [ ] fevers [ ] chills [ ] weight loss [ ] weight gain  HEENT: [ ] negative [ ] dry eyes [ ] eye irritation [ ] postnasal drip [ ] nasal congestion  CV: [ ] negative  [ ] chest pain [ ] orthopnea [ ] palpitations [ ] murmur  Resp: [ ] negative [ ] cough [ ] shortness of breath [ ] dyspnea [ ] wheezing [ ] sputum [ ] hemoptysis  GI: [ ] negative [ ] nausea [ ] vomiting [ ] diarrhea [ ] constipation [ ] abd pain [ ] dysphagia   : [ ] negative [ ] dysuria [ ] nocturia [ ] hematuria [ ] increased urinary frequency  Musculoskeletal: [ ] negative [ ] back pain [ ] myalgias [ ] arthralgias [ ] fracture  Skin: [ ] negative [ ] rash [ ] itch  Neurological: [ ] negative [ ] headache [ ] dizziness [ ] syncope [ ] weakness [ ] numbness  Psychiatric: [ ] negative [ ] anxiety [ ] depression  Endocrine: [ ] negative [ ] diabetes [ ] thyroid problem  Hematologic/Lymphatic: [ ] negative [ ] anemia [ ] bleeding problem  Allergic/Immunologic: [ ] negative [ ] itchy eyes [ ] nasal discharge [ ] hives [ ] angioedema  [x ] All other systems negative  [ ] Unable to assess ROS because ________    OBJECTIVE:  ICU Vital Signs Last 24 Hrs  T(C): 36.2 (26 Mar 2025 04:42), Max: 37.1 (25 Mar 2025 10:44)  T(F): 97.2 (26 Mar 2025 04:42), Max: 98.8 (25 Mar 2025 10:44)  HR: 69 (26 Mar 2025 06:16) (69 - 86)  BP: 144/61 (26 Mar 2025 04:42) (131/73 - 145/57)  BP(mean): 96 (25 Mar 2025 16:24) (96 - 96)  ABP: --  ABP(mean): --  RR: 18 (26 Mar 2025 04:42) (17 - 19)  SpO2: 95% (26 Mar 2025 06:16) (92% - 97%)    O2 Parameters below as of 26 Mar 2025 06:16  Patient On (Oxygen Delivery Method): room air              03-25 @ 07:01  -  03-26 @ 07:00  --------------------------------------------------------  IN: 1220 mL / OUT: 2250 mL / NET: -1030 mL      CAPILLARY BLOOD GLUCOSE      POCT Blood Glucose.: 216 mg/dL (25 Mar 2025 22:08)      PHYSICAL EXAM:  General:  Comfrotable at rest with Mild exertional respiratory distress.   HEENT: MMM:  Neck: No JVD  Respiratory: Diffuse wheeze, and some inspiratory squeecks  Cardiovascular: S1 S2 RRR  Abdomen: Soft, obese  Extremities: minimal dependent edema, warm and well perfused  Skin: Dry,   Neurological:Aox3  Psychiatry: appropriate mood and affect.     HOSPITAL MEDICATIONS:  heparin   Injectable 5000 Unit(s) SubCutaneous every 8 hours      carvedilol 6.25 milliGRAM(s) Oral every 12 hours  furosemide    Tablet 40 milliGRAM(s) Oral daily  losartan 50 milliGRAM(s) Oral daily    atorvastatin 80 milliGRAM(s) Oral at bedtime  dextrose 50% Injectable 25 Gram(s) IV Push once  dextrose Oral Gel 15 Gram(s) Oral once PRN  glucagon  Injectable 1 milliGRAM(s) IntraMuscular once  insulin glargine Injectable (LANTUS) 35 Unit(s) SubCutaneous every morning  insulin lispro (ADMELOG) corrective regimen sliding scale   SubCutaneous at bedtime  insulin lispro (ADMELOG) corrective regimen sliding scale   SubCutaneous three times a day before meals  insulin lispro Injectable (ADMELOG) 27 Unit(s) SubCutaneous three times a day before meals  levothyroxine 75 MICROGram(s) Oral daily  methylPREDNISolone sodium succinate Injectable 40 milliGRAM(s) IV Push every 24 hours    albuterol/ipratropium for Nebulization 3 milliLiter(s) Nebulizer every 6 hours  fluticasone propionate/ salmeterol 100-50 MICROgram(s) Diskus 1 Dose(s) Inhalation two times a day  montelukast 10 milliGRAM(s) Oral daily    acetaminophen     Tablet .. 650 milliGRAM(s) Oral every 6 hours PRN  melatonin 3 milliGRAM(s) Oral at bedtime PRN  ondansetron Injectable 4 milliGRAM(s) IV Push every 8 hours PRN  QUEtiapine 25 milliGRAM(s) Oral at bedtime    aluminum hydroxide/magnesium hydroxide/simethicone Suspension 30 milliLiter(s) Oral every 4 hours PRN        dextrose 5%. 1000 milliLiter(s) IV Continuous <Continuous>  sodium chloride 0.9%. 1000 milliLiter(s) IV Continuous <Continuous>            LABS:                        12.0   14.84 )-----------( 269      ( 25 Mar 2025 07:00 )             37.4     Hgb Trend: 12.0<--, 11.9<--, 11.7<--, 12.0<--  03-25    139  |  109[H]  |  61[H]  ----------------------------<  213[H]  4.9   |  26  |  1.55[H]    Ca    8.4[L]      25 Mar 2025 07:00      Creatinine Trend: 1.55<--, 1.78<--, 2.23<--, 1.84<--    Urinalysis Basic - ( 25 Mar 2025 07:00 )    Color: x / Appearance: x / SG: x / pH: x  Gluc: 213 mg/dL / Ketone: x  / Bili: x / Urobili: x   Blood: x / Protein: x / Nitrite: x   Leuk Esterase: x / RBC: x / WBC x   Sq Epi: x / Non Sq Epi: x / Bacteria: x                 Interval Events: NAEO. RA with stable Spo2. appears comfortable sitting in chair. patient states breathing is better today.     REVIEW OF SYSTEMS:  Constitutional: [ ] negative [ ] fevers [ ] chills [ ] weight loss [ ] weight gain  HEENT: [ ] negative [ ] dry eyes [ ] eye irritation [ ] postnasal drip [ ] nasal congestion  CV: [ ] negative  [ ] chest pain [ ] orthopnea [ ] palpitations [ ] murmur  Resp: [ ] negative [ ] cough [ ] shortness of breath [ ] dyspnea [ ] wheezing [ ] sputum [ ] hemoptysis  GI: [ ] negative [ ] nausea [ ] vomiting [ ] diarrhea [ ] constipation [ ] abd pain [ ] dysphagia   : [ ] negative [ ] dysuria [ ] nocturia [ ] hematuria [ ] increased urinary frequency  Musculoskeletal: [ ] negative [ ] back pain [ ] myalgias [ ] arthralgias [ ] fracture  Skin: [ ] negative [ ] rash [ ] itch  Neurological: [ ] negative [ ] headache [ ] dizziness [ ] syncope [ ] weakness [ ] numbness  Psychiatric: [ ] negative [ ] anxiety [ ] depression  Endocrine: [ ] negative [ ] diabetes [ ] thyroid problem  Hematologic/Lymphatic: [ ] negative [ ] anemia [ ] bleeding problem  Allergic/Immunologic: [ ] negative [ ] itchy eyes [ ] nasal discharge [ ] hives [ ] angioedema  [x ] All other systems negative  [ ] Unable to assess ROS because ________    OBJECTIVE:  ICU Vital Signs Last 24 Hrs  T(C): 36.2 (26 Mar 2025 04:42), Max: 37.1 (25 Mar 2025 10:44)  T(F): 97.2 (26 Mar 2025 04:42), Max: 98.8 (25 Mar 2025 10:44)  HR: 69 (26 Mar 2025 06:16) (69 - 86)  BP: 144/61 (26 Mar 2025 04:42) (131/73 - 145/57)  BP(mean): 96 (25 Mar 2025 16:24) (96 - 96)  ABP: --  ABP(mean): --  RR: 18 (26 Mar 2025 04:42) (17 - 19)  SpO2: 95% (26 Mar 2025 06:16) (92% - 97%)    O2 Parameters below as of 26 Mar 2025 06:16  Patient On (Oxygen Delivery Method): room air              03-25 @ 07:01  -  03-26 @ 07:00  --------------------------------------------------------  IN: 1220 mL / OUT: 2250 mL / NET: -1030 mL      CAPILLARY BLOOD GLUCOSE      POCT Blood Glucose.: 216 mg/dL (25 Mar 2025 22:08)      PHYSICAL EXAM:  General:  Comfrotable at rest with   HEENT: MMM:  Neck: No JVD  Respiratory: CTA b/l   Cardiovascular: S1 S2 RRR  Abdomen: Soft, obese  Extremities: minimal dependent edema, warm and well perfused      HOSPITAL MEDICATIONS:  heparin   Injectable 5000 Unit(s) SubCutaneous every 8 hours      carvedilol 6.25 milliGRAM(s) Oral every 12 hours  furosemide    Tablet 40 milliGRAM(s) Oral daily  losartan 50 milliGRAM(s) Oral daily    atorvastatin 80 milliGRAM(s) Oral at bedtime  dextrose 50% Injectable 25 Gram(s) IV Push once  dextrose Oral Gel 15 Gram(s) Oral once PRN  glucagon  Injectable 1 milliGRAM(s) IntraMuscular once  insulin glargine Injectable (LANTUS) 35 Unit(s) SubCutaneous every morning  insulin lispro (ADMELOG) corrective regimen sliding scale   SubCutaneous at bedtime  insulin lispro (ADMELOG) corrective regimen sliding scale   SubCutaneous three times a day before meals  insulin lispro Injectable (ADMELOG) 27 Unit(s) SubCutaneous three times a day before meals  levothyroxine 75 MICROGram(s) Oral daily  methylPREDNISolone sodium succinate Injectable 40 milliGRAM(s) IV Push every 24 hours    albuterol/ipratropium for Nebulization 3 milliLiter(s) Nebulizer every 6 hours  fluticasone propionate/ salmeterol 100-50 MICROgram(s) Diskus 1 Dose(s) Inhalation two times a day  montelukast 10 milliGRAM(s) Oral daily    acetaminophen     Tablet .. 650 milliGRAM(s) Oral every 6 hours PRN  melatonin 3 milliGRAM(s) Oral at bedtime PRN  ondansetron Injectable 4 milliGRAM(s) IV Push every 8 hours PRN  QUEtiapine 25 milliGRAM(s) Oral at bedtime    aluminum hydroxide/magnesium hydroxide/simethicone Suspension 30 milliLiter(s) Oral every 4 hours PRN        dextrose 5%. 1000 milliLiter(s) IV Continuous <Continuous>  sodium chloride 0.9%. 1000 milliLiter(s) IV Continuous <Continuous>            LABS:                        12.0   14.84 )-----------( 269      ( 25 Mar 2025 07:00 )             37.4     Hgb Trend: 12.0<--, 11.9<--, 11.7<--, 12.0<--  03-25    139  |  109[H]  |  61[H]  ----------------------------<  213[H]  4.9   |  26  |  1.55[H]    Ca    8.4[L]      25 Mar 2025 07:00      Creatinine Trend: 1.55<--, 1.78<--, 2.23<--, 1.84<--    Urinalysis Basic - ( 25 Mar 2025 07:00 )    Color: x / Appearance: x / SG: x / pH: x  Gluc: 213 mg/dL / Ketone: x  / Bili: x / Urobili: x   Blood: x / Protein: x / Nitrite: x   Leuk Esterase: x / RBC: x / WBC x   Sq Epi: x / Non Sq Epi: x / Bacteria: x

## 2025-03-27 ENCOUNTER — TRANSCRIPTION ENCOUNTER (OUTPATIENT)
Age: 80
End: 2025-03-27

## 2025-03-27 VITALS — OXYGEN SATURATION: 98 % | HEART RATE: 81 BPM

## 2025-03-27 LAB
ANION GAP SERPL CALC-SCNC: 6 MMOL/L — SIGNIFICANT CHANGE UP (ref 5–17)
BUN SERPL-MCNC: 59 MG/DL — HIGH (ref 7–23)
CALCIUM SERPL-MCNC: 8.4 MG/DL — LOW (ref 8.5–10.1)
CHLORIDE SERPL-SCNC: 109 MMOL/L — HIGH (ref 96–108)
CO2 SERPL-SCNC: 24 MMOL/L — SIGNIFICANT CHANGE UP (ref 22–31)
CREAT SERPL-MCNC: 1.3 MG/DL — SIGNIFICANT CHANGE UP (ref 0.5–1.3)
EGFR: 42 ML/MIN/1.73M2 — LOW
EGFR: 42 ML/MIN/1.73M2 — LOW
GLUCOSE BLDC GLUCOMTR-MCNC: 186 MG/DL — HIGH (ref 70–99)
GLUCOSE BLDC GLUCOMTR-MCNC: 249 MG/DL — HIGH (ref 70–99)
GLUCOSE BLDC GLUCOMTR-MCNC: 288 MG/DL — HIGH (ref 70–99)
GLUCOSE SERPL-MCNC: 217 MG/DL — HIGH (ref 70–99)
MAGNESIUM SERPL-MCNC: 1.8 MG/DL — SIGNIFICANT CHANGE UP (ref 1.6–2.6)
PHOSPHATE SERPL-MCNC: 3.3 MG/DL — SIGNIFICANT CHANGE UP (ref 2.5–4.5)
POTASSIUM SERPL-MCNC: 4.2 MMOL/L — SIGNIFICANT CHANGE UP (ref 3.5–5.3)
POTASSIUM SERPL-SCNC: 4.2 MMOL/L — SIGNIFICANT CHANGE UP (ref 3.5–5.3)
SODIUM SERPL-SCNC: 139 MMOL/L — SIGNIFICANT CHANGE UP (ref 135–145)

## 2025-03-27 PROCEDURE — 99233 SBSQ HOSP IP/OBS HIGH 50: CPT

## 2025-03-27 PROCEDURE — 99239 HOSP IP/OBS DSCHRG MGMT >30: CPT

## 2025-03-27 RX ORDER — BUDESONIDE AND FORMOTEROL FUMARATE DIHYDRATE 80; 4.5 UG/1; UG/1
2 AEROSOL RESPIRATORY (INHALATION)
Qty: 1 | Refills: 0
Start: 2025-03-27

## 2025-03-27 RX ORDER — FORMOTEROL FUMARATE DIHYDRATE 20 UG/2ML
2 SOLUTION RESPIRATORY (INHALATION)
Refills: 0 | DISCHARGE

## 2025-03-27 RX ORDER — OLMESARTAN MEDOXOMIL 5 MG/1
1 TABLET, FILM COATED ORAL
Refills: 0 | DISCHARGE

## 2025-03-27 RX ORDER — BUDESONIDE 90 UG/1
2 AEROSOL, POWDER RESPIRATORY (INHALATION)
Refills: 0 | DISCHARGE

## 2025-03-27 RX ORDER — QUETIAPINE FUMARATE 25 MG/1
1 TABLET ORAL
Refills: 0 | DISCHARGE

## 2025-03-27 RX ORDER — MONTELUKAST SODIUM 10 MG/1
1 TABLET ORAL
Refills: 0 | DISCHARGE

## 2025-03-27 RX ORDER — DAPAGLIFLOZIN 5 MG/1
1 TABLET, FILM COATED ORAL
Refills: 0 | DISCHARGE

## 2025-03-27 RX ORDER — LEVOTHYROXINE SODIUM 300 MCG
1 TABLET ORAL
Refills: 0 | DISCHARGE

## 2025-03-27 RX ORDER — PREDNISOLONE 5 MG
1 TABLET ORAL
Qty: 11 | Refills: 0
Start: 2025-03-27

## 2025-03-27 RX ORDER — PREDNISONE 20 MG/1
1 TABLET ORAL
Qty: 11 | Refills: 0
Start: 2025-03-27

## 2025-03-27 RX ORDER — CARVEDILOL 3.12 MG/1
1 TABLET, FILM COATED ORAL
Refills: 0 | DISCHARGE

## 2025-03-27 RX ORDER — ATORVASTATIN CALCIUM 80 MG/1
1 TABLET, FILM COATED ORAL
Refills: 0 | DISCHARGE

## 2025-03-27 RX ORDER — ALBUTEROL SULFATE 2.5 MG/3ML
2 VIAL, NEBULIZER (ML) INHALATION
Qty: 1 | Refills: 0
Start: 2025-03-27

## 2025-03-27 RX ADMIN — HEPARIN SODIUM 5000 UNIT(S): 1000 INJECTION INTRAVENOUS; SUBCUTANEOUS at 13:10

## 2025-03-27 RX ADMIN — FUROSEMIDE 40 MILLIGRAM(S): 10 INJECTION INTRAMUSCULAR; INTRAVENOUS at 05:07

## 2025-03-27 RX ADMIN — CARVEDILOL 6.25 MILLIGRAM(S): 3.12 TABLET, FILM COATED ORAL at 05:07

## 2025-03-27 RX ADMIN — INSULIN LISPRO 27 UNIT(S): 100 INJECTION, SOLUTION INTRAVENOUS; SUBCUTANEOUS at 17:02

## 2025-03-27 RX ADMIN — MONTELUKAST SODIUM 10 MILLIGRAM(S): 10 TABLET ORAL at 11:52

## 2025-03-27 RX ADMIN — Medication 75 MICROGRAM(S): at 05:07

## 2025-03-27 RX ADMIN — LOSARTAN POTASSIUM 50 MILLIGRAM(S): 100 TABLET, FILM COATED ORAL at 05:09

## 2025-03-27 RX ADMIN — INSULIN LISPRO 27 UNIT(S): 100 INJECTION, SOLUTION INTRAVENOUS; SUBCUTANEOUS at 11:53

## 2025-03-27 RX ADMIN — INSULIN LISPRO 6: 100 INJECTION, SOLUTION INTRAVENOUS; SUBCUTANEOUS at 11:54

## 2025-03-27 RX ADMIN — CARVEDILOL 6.25 MILLIGRAM(S): 3.12 TABLET, FILM COATED ORAL at 17:02

## 2025-03-27 RX ADMIN — INSULIN LISPRO 27 UNIT(S): 100 INJECTION, SOLUTION INTRAVENOUS; SUBCUTANEOUS at 08:26

## 2025-03-27 RX ADMIN — INSULIN LISPRO 2: 100 INJECTION, SOLUTION INTRAVENOUS; SUBCUTANEOUS at 17:02

## 2025-03-27 RX ADMIN — Medication 1 DOSE(S): at 17:01

## 2025-03-27 RX ADMIN — INSULIN GLARGINE-YFGN 35 UNIT(S): 100 INJECTION, SOLUTION SUBCUTANEOUS at 08:23

## 2025-03-27 RX ADMIN — Medication 1 DOSE(S): at 05:10

## 2025-03-27 RX ADMIN — METHYLPREDNISOLONE ACETATE 40 MILLIGRAM(S): 80 INJECTION, SUSPENSION INTRA-ARTICULAR; INTRALESIONAL; INTRAMUSCULAR; SOFT TISSUE at 05:06

## 2025-03-27 RX ADMIN — HEPARIN SODIUM 5000 UNIT(S): 1000 INJECTION INTRAVENOUS; SUBCUTANEOUS at 05:07

## 2025-03-27 RX ADMIN — INSULIN LISPRO 4: 100 INJECTION, SOLUTION INTRAVENOUS; SUBCUTANEOUS at 08:26

## 2025-03-27 NOTE — PROGRESS NOTE ADULT - SUBJECTIVE AND OBJECTIVE BOX
Interval Events: NAEO. Remains on RA.     REVIEW OF SYSTEMS:  Constitutional: [ ] negative [ ] fevers [ ] chills [ ] weight loss [ ] weight gain  HEENT: [ ] negative [ ] dry eyes [ ] eye irritation [ ] postnasal drip [ ] nasal congestion  CV: [ ] negative  [ ] chest pain [ ] orthopnea [ ] palpitations [ ] murmur  Resp: [ ] negative [ ] cough [ ] shortness of breath [ ] dyspnea [ ] wheezing [ ] sputum [ ] hemoptysis  GI: [ ] negative [ ] nausea [ ] vomiting [ ] diarrhea [ ] constipation [ ] abd pain [ ] dysphagia   : [ ] negative [ ] dysuria [ ] nocturia [ ] hematuria [ ] increased urinary frequency  Musculoskeletal: [ ] negative [ ] back pain [ ] myalgias [ ] arthralgias [ ] fracture  Skin: [ ] negative [ ] rash [ ] itch  Neurological: [ ] negative [ ] headache [ ] dizziness [ ] syncope [ ] weakness [ ] numbness  Psychiatric: [ ] negative [ ] anxiety [ ] depression  Endocrine: [ ] negative [ ] diabetes [ ] thyroid problem  Hematologic/Lymphatic: [ ] negative [ ] anemia [ ] bleeding problem  Allergic/Immunologic: [ ] negative [ ] itchy eyes [ ] nasal discharge [ ] hives [ ] angioedema  [x] All other systems negative      OBJECTIVE:  ICU Vital Signs Last 24 Hrs  T(C): 36.2 (27 Mar 2025 04:50), Max: 36.3 (26 Mar 2025 16:42)  T(F): 97.2 (27 Mar 2025 04:50), Max: 97.4 (26 Mar 2025 16:42)  HR: 83 (27 Mar 2025 04:50) (69 - 83)  BP: 132/71 (27 Mar 2025 04:50) (110/65 - 132/71)  BP(mean): --  ABP: --  ABP(mean): --  RR: 18 (27 Mar 2025 04:50) (18 - 18)  SpO2: 97% (27 Mar 2025 04:50) (91% - 98%)    O2 Parameters below as of 27 Mar 2025 04:50  Patient On (Oxygen Delivery Method): BiPAP/CPAP              03-26 @ 07:01  -  03-27 @ 07:00  --------------------------------------------------------  IN: 240 mL / OUT: 1100 mL / NET: -860 mL      CAPILLARY BLOOD GLUCOSE      POCT Blood Glucose.: 252 mg/dL (26 Mar 2025 21:12)      PHYSICAL EXAM:  General:  Comfrotable at rest with   HEENT: MMM:  Neck: No JVD  Respiratory: CTA b/l   Cardiovascular: S1 S2 RRR  Abdomen: Soft, obese  Extremities: minimal dependent edema, warm and well perfused    HOSPITAL MEDICATIONS:  heparin   Injectable 5000 Unit(s) SubCutaneous every 8 hours      carvedilol 6.25 milliGRAM(s) Oral every 12 hours  furosemide    Tablet 40 milliGRAM(s) Oral daily  losartan 50 milliGRAM(s) Oral daily    atorvastatin 80 milliGRAM(s) Oral at bedtime  dextrose 50% Injectable 25 Gram(s) IV Push once  dextrose Oral Gel 15 Gram(s) Oral once PRN  glucagon  Injectable 1 milliGRAM(s) IntraMuscular once  insulin glargine Injectable (LANTUS) 35 Unit(s) SubCutaneous every morning  insulin lispro (ADMELOG) corrective regimen sliding scale   SubCutaneous at bedtime  insulin lispro (ADMELOG) corrective regimen sliding scale   SubCutaneous three times a day before meals  insulin lispro Injectable (ADMELOG) 27 Unit(s) SubCutaneous three times a day before meals  levothyroxine 75 MICROGram(s) Oral daily  methylPREDNISolone sodium succinate Injectable 40 milliGRAM(s) IV Push every 24 hours    albuterol/ipratropium for Nebulization 3 milliLiter(s) Nebulizer every 6 hours PRN  fluticasone propionate/ salmeterol 250-50 MICROgram(s) Diskus 1 Dose(s) Inhalation two times a day  montelukast 10 milliGRAM(s) Oral daily    acetaminophen     Tablet .. 650 milliGRAM(s) Oral every 6 hours PRN  melatonin 3 milliGRAM(s) Oral at bedtime PRN  ondansetron Injectable 4 milliGRAM(s) IV Push every 8 hours PRN  QUEtiapine 25 milliGRAM(s) Oral at bedtime    aluminum hydroxide/magnesium hydroxide/simethicone Suspension 30 milliLiter(s) Oral every 4 hours PRN        dextrose 5%. 1000 milliLiter(s) IV Continuous <Continuous>            LABS:                        13.2   13.33 )-----------( 295      ( 26 Mar 2025 10:40 )             40.9     Hgb Trend: 13.2<--, 12.0<--, 11.9<--, 11.7<--, 12.0<--  03-26    135  |  105  |  64[H]  ----------------------------<  405[H]  4.6   |  25  |  1.74[H]    Ca    8.4[L]      26 Mar 2025 10:40  Phos  2.9     03-26  Mg     1.8     03-26    TPro  6.3  /  Alb  2.5[L]  /  TBili  0.3  /  DBili  x   /  AST  20  /  ALT  24  /  AlkPhos  101  03-26    Creatinine Trend: 1.74<--, 1.55<--, 1.78<--, 2.23<--, 1.84<--    Urinalysis Basic - ( 26 Mar 2025 10:40 )    Color: x / Appearance: x / SG: x / pH: x  Gluc: 405 mg/dL / Ketone: x  / Bili: x / Urobili: x   Blood: x / Protein: x / Nitrite: x   Leuk Esterase: x / RBC: x / WBC x   Sq Epi: x / Non Sq Epi: x / Bacteria: x          EKG:     Interval Events: NAEO. Remains on RA. Wheezing improved today.     REVIEW OF SYSTEMS:  Constitutional: [ ] negative [ ] fevers [ ] chills [ ] weight loss [ ] weight gain  HEENT: [ ] negative [ ] dry eyes [ ] eye irritation [ ] postnasal drip [ ] nasal congestion  CV: [ ] negative  [ ] chest pain [ ] orthopnea [ ] palpitations [ ] murmur  Resp: [ ] negative [ ] cough [ ] shortness of breath [ ] dyspnea [ ] wheezing [ ] sputum [ ] hemoptysis  GI: [ ] negative [ ] nausea [ ] vomiting [ ] diarrhea [ ] constipation [ ] abd pain [ ] dysphagia   : [ ] negative [ ] dysuria [ ] nocturia [ ] hematuria [ ] increased urinary frequency  Musculoskeletal: [ ] negative [ ] back pain [ ] myalgias [ ] arthralgias [ ] fracture  Skin: [ ] negative [ ] rash [ ] itch  Neurological: [ ] negative [ ] headache [ ] dizziness [ ] syncope [ ] weakness [ ] numbness  Psychiatric: [ ] negative [ ] anxiety [ ] depression  Endocrine: [ ] negative [ ] diabetes [ ] thyroid problem  Hematologic/Lymphatic: [ ] negative [ ] anemia [ ] bleeding problem  Allergic/Immunologic: [ ] negative [ ] itchy eyes [ ] nasal discharge [ ] hives [ ] angioedema  [x] All other systems negative      OBJECTIVE:  ICU Vital Signs Last 24 Hrs  T(C): 36.2 (27 Mar 2025 04:50), Max: 36.3 (26 Mar 2025 16:42)  T(F): 97.2 (27 Mar 2025 04:50), Max: 97.4 (26 Mar 2025 16:42)  HR: 83 (27 Mar 2025 04:50) (69 - 83)  BP: 132/71 (27 Mar 2025 04:50) (110/65 - 132/71)  BP(mean): --  ABP: --  ABP(mean): --  RR: 18 (27 Mar 2025 04:50) (18 - 18)  SpO2: 97% (27 Mar 2025 04:50) (91% - 98%)    O2 Parameters below as of 27 Mar 2025 04:50  Patient On (Oxygen Delivery Method): BiPAP/CPAP              03-26 @ 07:01  -  03-27 @ 07:00  --------------------------------------------------------  IN: 240 mL / OUT: 1100 mL / NET: -860 mL      CAPILLARY BLOOD GLUCOSE      POCT Blood Glucose.: 252 mg/dL (26 Mar 2025 21:12)      PHYSICAL EXAM:  General:  Comfrotable at rest with   HEENT: MMM:  Neck: No JVD  Respiratory: CTA b/l   Cardiovascular: S1 S2 RRR  Abdomen: Soft, obese  Extremities: minimal dependent edema, warm and well perfused    HOSPITAL MEDICATIONS:  heparin   Injectable 5000 Unit(s) SubCutaneous every 8 hours      carvedilol 6.25 milliGRAM(s) Oral every 12 hours  furosemide    Tablet 40 milliGRAM(s) Oral daily  losartan 50 milliGRAM(s) Oral daily    atorvastatin 80 milliGRAM(s) Oral at bedtime  dextrose 50% Injectable 25 Gram(s) IV Push once  dextrose Oral Gel 15 Gram(s) Oral once PRN  glucagon  Injectable 1 milliGRAM(s) IntraMuscular once  insulin glargine Injectable (LANTUS) 35 Unit(s) SubCutaneous every morning  insulin lispro (ADMELOG) corrective regimen sliding scale   SubCutaneous at bedtime  insulin lispro (ADMELOG) corrective regimen sliding scale   SubCutaneous three times a day before meals  insulin lispro Injectable (ADMELOG) 27 Unit(s) SubCutaneous three times a day before meals  levothyroxine 75 MICROGram(s) Oral daily  methylPREDNISolone sodium succinate Injectable 40 milliGRAM(s) IV Push every 24 hours    albuterol/ipratropium for Nebulization 3 milliLiter(s) Nebulizer every 6 hours PRN  fluticasone propionate/ salmeterol 250-50 MICROgram(s) Diskus 1 Dose(s) Inhalation two times a day  montelukast 10 milliGRAM(s) Oral daily    acetaminophen     Tablet .. 650 milliGRAM(s) Oral every 6 hours PRN  melatonin 3 milliGRAM(s) Oral at bedtime PRN  ondansetron Injectable 4 milliGRAM(s) IV Push every 8 hours PRN  QUEtiapine 25 milliGRAM(s) Oral at bedtime    aluminum hydroxide/magnesium hydroxide/simethicone Suspension 30 milliLiter(s) Oral every 4 hours PRN        dextrose 5%. 1000 milliLiter(s) IV Continuous <Continuous>            LABS:                        13.2   13.33 )-----------( 295      ( 26 Mar 2025 10:40 )             40.9     Hgb Trend: 13.2<--, 12.0<--, 11.9<--, 11.7<--, 12.0<--  03-26    135  |  105  |  64[H]  ----------------------------<  405[H]  4.6   |  25  |  1.74[H]    Ca    8.4[L]      26 Mar 2025 10:40  Phos  2.9     03-26  Mg     1.8     03-26    TPro  6.3  /  Alb  2.5[L]  /  TBili  0.3  /  DBili  x   /  AST  20  /  ALT  24  /  AlkPhos  101  03-26    Creatinine Trend: 1.74<--, 1.55<--, 1.78<--, 2.23<--, 1.84<--    Urinalysis Basic - ( 26 Mar 2025 10:40 )    Color: x / Appearance: x / SG: x / pH: x  Gluc: 405 mg/dL / Ketone: x  / Bili: x / Urobili: x   Blood: x / Protein: x / Nitrite: x   Leuk Esterase: x / RBC: x / WBC x   Sq Epi: x / Non Sq Epi: x / Bacteria: x          EKG:

## 2025-03-27 NOTE — PROGRESS NOTE ADULT - NS ATTEND AMEND GEN_ALL_CORE FT
79 F pmh HTN, DM, asthma, lung cancer, presents to the hospital for cough and shortness of breath admitted with asthma exacerbation 2/2 to RSV infection  RSV positive.  history of lung cx - possibly DIPNECH patient states that asthma/pulmonary nodules dx at same time ~20 years ago.     Recs:   - on RA with stable Spo2.  improved wheeze now only end expiratory and patient states she feels well, making it more likely this was simply secondary to RSV on what ever primary process wheather true asthma or NET related.  She is back to baseline'  - Discharge home on high dose symbicort or Advair 250 BID, nebs PRN, and a taper of her prednisone over the next 7 days.   - She has a pulmonologist but does not know their name at [facility] and has a sleep study that states she need cpap.  per patient they will order it.   - would plug her in for home follow up incase she is not able to see her pulmonoray team.
79 F pmh HTN, DM, asthma, lung cancer, presents to the hospital for cough and shortness of breath admitted with asthma exacerbation 2/2 to RSV infection  RSV positive.  Discussed with Radiology and Her Oncology team. 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    Imaging with air trapping but may be related to tracheomalacia or her RSV vs her NET.     Recs:   can continue duonebs/steroids   - continue advair   - Will start CPAP QHS to see if she has symptomatic relief. for her tracheomalacia.   - Given now progression to NETs and possible Dipnech as initial cause of her asthma in the 50s. if not improving in a few days would consider trail of Somatostatin analogues. in discussion with her oncologist.   - rest of care per primary team.   - spoke with patient using  #[id number]

## 2025-03-27 NOTE — DISCHARGE NOTE PROVIDER - NSDCMRMEDTOKEN_GEN_ALL_CORE_FT
acetaminophen 325 mg oral tablet: 2 tab(s) orally every 6 hours, As needed, Temp greater or equal to 38.5C (101.3F), Mild Pain (1 - 3)  alcohol swabs : Apply topically to affected area 4 times a day   Basaglar KwikPen 100 units/mL subcutaneous solution: 40 unit(s) subcutaneous once a day ( take in the am)  Insulin Pen Needles, 4mm: 1 application subcutaneously 4 times a day. ** Use with insulin pen **   lancets: 1 application subcutaneously 4 times a day   Lasix 40 mg oral tablet: 1 tab(s) orally once a day  NovoLOG 100 units/mL injectable solution: 30 unit(s) injectable 3 times a day (with meals)  omeprazole 40 mg oral delayed release capsule: 1 cap(s) orally once a day  polyethylene glycol 3350 oral powder for reconstitution: 17 gram(s) orally once a day as needed for constipation   Synthroid 50 mcg (0.05 mg) oral tablet: 1 tab(s) orally once a day  test strips (per patient&#x27;s insurance): 1 application subcutaneously 4 times a day. ** Compatible with patient&#x27;s glucometer **  Toprol- mg oral tablet, extended release: 1 tab(s) orally once a day   acetaminophen 325 mg oral tablet: 2 tab(s) orally every 6 hours, As needed, Temp greater or equal to 38.5C (101.3F), Mild Pain (1 - 3)  Albuterol (Eqv-ProAir HFA) 90 mcg/inh inhalation aerosol: 2 puff(s) inhaled every 6 hours as needed for  shortness of breath and/or wheezing  alcohol swabs : Apply topically to affected area 4 times a day   atorvastatin 80 mg oral tablet: 1 tab(s) orally once a day  Basaglar KwikPen 100 units/mL subcutaneous solution: 35 unit(s) subcutaneous once a day ( take in the am)  carvedilol 6.25 mg oral tablet: 1 tab(s) orally every 12 hours  dapagliflozin 10 mg oral tablet: 1 tab(s) orally once a day  Insulin Pen Needles, 4mm: 1 application subcutaneously 4 times a day. ** Use with insulin pen **   lancets: 1 application subcutaneously 4 times a day   Lasix 40 mg oral tablet: 1 tab(s) orally once a day  levothyroxine 75 mcg (0.075 mg) oral tablet: 1 tab(s) orally once a day  montelukast 10 mg oral tablet: 1 tab(s) orally once a day  NovoLOG 100 units/mL injectable solution: 35 unit(s) injectable 3 times a day (with meals)  olmesartan 20 mg oral tablet: 1 tab(s) orally once a day  omeprazole 40 mg oral delayed release capsule: 1 cap(s) orally once a day  polyethylene glycol 3350 oral powder for reconstitution: 17 gram(s) orally once a day as needed for constipation   predniSONE 10 mg oral tablet: 1 tab(s) orally Day1: 4 tabs, Day2: 3 tabs, Day3: 2 tabs, Day4: 1 tabs, Day5: 0.5 tabs  Seroquel 25 mg oral tablet: 1 tab(s) orally once a day (at bedtime)  Symbicort 160 mcg-4.5 mcg/inh inhalation aerosol: 2 puff(s) inhaled 2 times a day  test strips (per patient&#x27;s insurance): 1 application subcutaneously 4 times a day. ** Compatible with patient&#x27;s glucometer **

## 2025-03-27 NOTE — DISCHARGE NOTE NURSING/CASE MANAGEMENT/SOCIAL WORK - FINANCIAL ASSISTANCE
[facility] provides services at a reduced cost to those who are determined to be eligible through [facility]’s financial assistance program. Information regarding [facility]’s financial assistance program can be found by going to [email or web address] or by calling [phone].

## 2025-03-27 NOTE — DISCHARGE NOTE PROVIDER - NSDCCPCAREPLAN_GEN_ALL_CORE_FT
PRINCIPAL DISCHARGE DIAGNOSIS  Diagnosis: Acute asthma exacerbation  Assessment and Plan of Treatment:       SECONDARY DISCHARGE DIAGNOSES  Diagnosis: RSV infection  Assessment and Plan of Treatment:

## 2025-03-27 NOTE — DISCHARGE NOTE PROVIDER - NSFOLLOWUPCLINICS_GEN_ALL_ED_FT
[facility]  Pulmonology  [address] [place] [address], [place] [address]  Phone: [phone]  Fax:

## 2025-03-27 NOTE — DISCHARGE NOTE PROVIDER - NSDCFUADDAPPT_GEN_ALL_CORE_FT
APPTS ARE READY TO BE MADE: [X] YES    Best Family or Patient Contact (if needed):    Additional Information about above appointments (if needed):    1: Pulm clinic  2: P't PCP  3: Pt's oncologist     Other comments or requests:

## 2025-03-27 NOTE — PROGRESS NOTE ADULT - ASSESSMENT
79 F pmh HTN, DM, asthma, lung cancer, presents to the hospital for cough and shortness of breath admitted with asthma exacerbation 2/2 to RSV infection  RSV positive.  history of lung cx - possibly DIPNECH patient states that asthma/pulmonary nodules dx at same time ~20 years ago.     Recs:   - on RA with stable Spo2.   - can continue duonebs/steroids   - continue advair   - can trial CPAP at night for tracheomalacia for symptom control. If patient tolerates will need outpatient pulmonary follow up to arrange for CPAP at home.   - rest of care per primary team.   - spoke with patient using  #[id number]    Discussed with Dr. Arellano.  79 F pmh HTN, DM, asthma, lung cancer, presents to the hospital for cough and shortness of breath admitted with asthma exacerbation 2/2 to RSV infection  RSV positive.  history of lung cx - possibly DIPNECH patient states that asthma/pulmonary nodules dx at same time ~20 years ago.     Recs:   - on RA with stable Spo2.   - can continue duonebs/steroids   - continue advair   - can trial CPAP at night for tracheomalacia for symptom control. If patient tolerates will need outpatient pulmonary follow up to arrange for CPAP at home.   - from pulmonary standpoint patient stable for discharge   - she has oncology appt on 4/3   - will need outpatient pulmonary appointment upon discharge as well.   - rest of care per primary team.    Discussed with Dr. Arellano.

## 2025-03-27 NOTE — PROGRESS NOTE ADULT - REASON FOR ADMISSION
Asthma exacerbation 2/2 RSV infection

## 2025-03-27 NOTE — DISCHARGE NOTE NURSING/CASE MANAGEMENT/SOCIAL WORK - PATIENT PORTAL LINK FT
You can access the FollowMyHealth Patient Portal offered by [facility] by registering at the following website: [email or web address]. By joining [employer]’s FollowMyHealth portal, you will also be able to view your health information using other applications (apps) compatible with our system.

## 2025-03-27 NOTE — DISCHARGE NOTE PROVIDER - HOSPITAL COURSE
79 F w/ history of HTN, DM II, asthma, lung cancer who presented w/ cough and shortness of breath & was admitted for asthma exacerbation due to RSV infection. CT chest showed numerous bilateral pulmonary nodules as noted on the PET/CT of March 20, 2025 - patient has a history of lung cancer. Pt was put on Solu-Medrol, DuoNeb, Singulair & Advair. As per pulmonary consultant, she was also started on CPAP QHS. She improved and was weaned off oxygen. Pt has been cleared for discharge by pulmonary as per my conversation w/ Dr. Arellano today. She also had prerenal WILBER POA that has resolved.       Discharge time: 43 minutes     Vital Signs Last 24 Hrs  T(C): 36.3 (27 Mar 2025 11:01), Max: 36.3 (26 Mar 2025 16:42)  T(F): 97.4 (27 Mar 2025 11:01), Max: 97.4 (26 Mar 2025 16:42)  HR: 86 (27 Mar 2025 11:01) (69 - 86)  BP: 136/69 (27 Mar 2025 11:01) (110/65 - 136/69)  RR: 18 (27 Mar 2025 11:01) (18 - 18)  SpO2: 93% (27 Mar 2025 11:01) (91% - 98%)    Parameters below as of 27 Mar 2025 09:23  Patient On (Oxygen Delivery Method): room air    PHYSICAL EXAM:  GENERAL: NAD   HEAD:  Atraumatic, Normocephalic  EYES: EOMI, PERRLA   NECK: Supple   NERVOUS SYSTEM:  Alert & Oriented X3, Good concentration   CHEST/LUNG: bilateral wheezing   HEART: Regular rate and rhythm; No murmurs, rubs, or gallops  ABDOMEN: Soft, Nontender, Nondistended; Bowel sounds present  EXTREMITIES: bilateral LE +1 edema

## 2025-03-31 DIAGNOSIS — Z86.73 PERSONAL HISTORY OF TRANSIENT ISCHEMIC ATTACK (TIA), AND CEREBRAL INFARCTION WITHOUT RESIDUAL DEFICITS: ICD-10-CM
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
## 2025-03-31 DIAGNOSIS — E03.9 HYPOTHYROIDISM, UNSPECIFIED: ICD-10-CM

## 2025-03-31 DIAGNOSIS — E78.5 HYPERLIPIDEMIA, UNSPECIFIED: ICD-10-CM

## 2025-03-31 DIAGNOSIS — I10 ESSENTIAL (PRIMARY) HYPERTENSION: ICD-10-CM

## 2025-03-31 DIAGNOSIS — Z79.4 LONG TERM (CURRENT) USE OF INSULIN: ICD-10-CM

## 2025-03-31 DIAGNOSIS — N17.9 ACUTE KIDNEY FAILURE, UNSPECIFIED: ICD-10-CM

## 2025-03-31 DIAGNOSIS — J45.901 UNSPECIFIED ASTHMA WITH (ACUTE) EXACERBATION: ICD-10-CM

## 2025-03-31 DIAGNOSIS — Z66 DO NOT RESUSCITATE: ICD-10-CM

## 2025-03-31 DIAGNOSIS — Z79.890 HORMONE REPLACEMENT THERAPY: ICD-10-CM

## 2025-03-31 DIAGNOSIS — E87.5 HYPERKALEMIA: ICD-10-CM

## 2025-03-31 DIAGNOSIS — E87.1 HYPO-OSMOLALITY AND HYPONATREMIA: ICD-10-CM

## 2025-03-31 DIAGNOSIS — B97.4 RESPIRATORY SYNCYTIAL VIRUS AS THE CAUSE OF DISEASES CLASSIFIED ELSEWHERE: ICD-10-CM

## 2025-03-31 DIAGNOSIS — Z85.118 PERSONAL HISTORY OF OTHER MALIGNANT NEOPLASM OF BRONCHUS AND LUNG: ICD-10-CM

## 2025-03-31 DIAGNOSIS — E11.65 TYPE 2 DIABETES MELLITUS WITH HYPERGLYCEMIA: ICD-10-CM
